# Patient Record
Sex: FEMALE | Race: WHITE | Employment: OTHER | URBAN - METROPOLITAN AREA
[De-identification: names, ages, dates, MRNs, and addresses within clinical notes are randomized per-mention and may not be internally consistent; named-entity substitution may affect disease eponyms.]

---

## 2017-02-27 RX ORDER — TACROLIMUS 1 MG/1
1 CAPSULE ORAL 2 TIMES DAILY
Qty: 180 CAP | Refills: 3 | OUTPATIENT
Start: 2017-02-27 | End: 2018-01-09 | Stop reason: SDUPTHER

## 2017-02-27 NOTE — TELEPHONE ENCOUNTER
Received VM from patient who stated she changed insurance 1/2017 and needs to have new Tacrolimus prescription sent to [facility]. Patient reports she has about 10 days left of medication, but needs to have medication refilled now because she is going out of town next week. Prescription called into [facility] ([phone]). Verified medication requires PA and initiated process. Phone call placed to patient. Updated her on the above. Encouraged patient to follow up with [facility] to confirm medication will be dispensed ASAP. Patient verbalized understanding.

## 2017-03-22 DIAGNOSIS — G52.1 GLOSSOPHARYNGEAL NEURALGIA: ICD-10-CM

## 2017-03-22 NOTE — TELEPHONE ENCOUNTER
Pt needs to refill her med (gabapentin)400 mg . 2 caps by 3 times a day. Pt wants her RX send to [facility] pharmacy. Thank you.

## 2017-03-23 RX ORDER — GABAPENTIN 400 MG/1
800 CAPSULE ORAL 3 TIMES DAILY
Qty: 540 CAP | Refills: 3 | Status: SHIPPED | OUTPATIENT
Start: 2017-03-23 | End: 2018-03-29 | Stop reason: SDUPTHER

## 2017-03-27 ENCOUNTER — OFFICE VISIT (OUTPATIENT)
Dept: HEMATOLOGY | Age: 77
End: 2017-03-27

## 2017-03-27 ENCOUNTER — DOCUMENTATION ONLY (OUTPATIENT)
Dept: HEMATOLOGY | Age: 77
End: 2017-03-27

## 2017-03-27 VITALS
DIASTOLIC BLOOD PRESSURE: 76 MMHG | TEMPERATURE: 98.3 F | HEIGHT: 63 IN | HEART RATE: 79 BPM | WEIGHT: 160.25 LBS | OXYGEN SATURATION: 98 % | SYSTOLIC BLOOD PRESSURE: 153 MMHG | BODY MASS INDEX: 28.39 KG/M2

## 2017-03-27 DIAGNOSIS — Z94.4 H/O LIVER TRANSPLANT (HCC): ICD-10-CM

## 2017-03-27 DIAGNOSIS — T86.40 COMPLICATION OF TRANSPLANTED LIVER, UNSPECIFIED COMPLICATION (HCC): Primary | ICD-10-CM

## 2017-03-27 DIAGNOSIS — Z79.899 LONG TERM CURRENT USE OF IMMUNOSUPPRESSIVE DRUG: ICD-10-CM

## 2017-03-27 RX ORDER — AZATHIOPRINE 50 MG/1
100 TABLET ORAL DAILY
Qty: 180 TAB | Refills: 3 | Status: SHIPPED | OUTPATIENT
Start: 2017-03-27 | End: 2018-04-16 | Stop reason: SDUPTHER

## 2017-03-27 NOTE — PROGRESS NOTES
Phone call placed to [facility] ([phone]) to request most recent lab results. Records were received and provided to Dr. Peng Naylor.

## 2017-03-27 NOTE — PROGRESS NOTES
93 Suzanne Viramontes MD, PARDEEP Ricci PA-C Raymond Calin, MD, MD Dipti Arellano NP     [facility], NP        [address] [facility], [address] [place] [address], [address]     [phone]     FAX: [address] [place] [address], [address] [place] [address], [address] [facility] [address]     [phone]     FAX: [phone]           Patient Care Team:  Kamari Asher MD as PCP - General (Family Practice)  Kamari Asher MD (Family Practice)  Shelton Cordon MD (Cardiology)      Problem List  Date Reviewed: 9/29/2016          Codes Class Noted    Long term current use of immunosuppressive drug ICD-10-CM: Z79.899  ICD-9-CM: V58.69  3/27/2017        Liver transplant complication [facility]) [id number]-SP: T86.40  ICD-9-CM: 996.82  5/36/9915        Diastolic congestive heart failure ([facility] [address] ICD-10-CM: I50.30  ICD-9-CM: 428.30, 428.0  9/29/2016        H/O liver transplant [facility]) ICD-10-CM: Z94.4  ICD-9-CM: V42.7  8/17/2016        S/P cataract surgery ICD-10-CM: Z98.49  ICD-9-CM: V45.61  8/17/2016        Osteoporosis ICD-10-CM: M81.0  ICD-9-CM: 733.00  8/17/2016        H/O cervical spine surgery ICD-10-CM: Z98.890  ICD-9-CM: V45.89  8/17/2016        Basal cell carcinoma ICD-10-CM: C44.91  ICD-9-CM: 173.91  8/17/2016    Overview Signed 8/17/2016 11:28 AM by Ke Raymundo MD     Multiple locations, face, back, neck, legs             S/P aortic valve replacement ICD-10-CM: Z95.2  ICD-9-CM: V43.3  8/17/2016        Pacemaker ICD-10-CM: Z95.0  ICD-9-CM: V45.01  8/17/2016        Essential and other specified forms of tremor ICD-10-CM: G25.0, G25.2  ICD-9-CM: 333.1  8/12/2013        Meningioma ([facility] [address] ICD-10-CM: D32.9  ICD-9-CM: 225.2  8/12/2013        Leg swelling ICD-10-CM: M79.89  ICD-9-CM: 729.81  Unknown        Atrial fib/flutter, transient ICD-9-CM: LVN0653  Unknown        Glossopharyngeal neuralgia ICD-10-CM: 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 The active problem list, all pertinent past medical history, medications, radiologic findings and laboratory findings related to the liver disorder were reviewed with the patient. The patient is a 77 y.o.  female who underwent a liver transplant in 2003 at [facility] for cirrhosis and hepatocellular carcinoma. She does not know why she developed cirrhosis. She stated that she had hepatitis since the age of 16 years. She was followed closely at [facility] after the LT for 3 years. She then moved to [facility], was there for 5 years and move to [place] about 5 years ago. She has not had any contact with the [facility] LT program in 10 years. The patient is currently receiving tacrolimus with Imuran for immune suppression. There has been no episodes of acute rejection. [address] has not recurred. The most recent laboratory studies indicate that the liver transaminases are normal, alkaline phosphatase is normal, tests of hepatic synthetic and metabolic function are normal, and the platelet count is normal.      The patient has no symptoms which could be attributed to the liver disorder. The patient completes all daily activities without any functional limitations. The patient has not experienced problems concentrating, swelling of the abdomen, swelling of the lower extremities, hematemesis, hematochezia. Allergies   Allergen Reactions    Codeine Rash    Demerol (Pf) [Meperidine (Pf)] Rash       Current Outpatient Prescriptions   Medication Sig    azaTHIOprine (IMURAN) 50 mg tablet Take 2 Tabs by mouth daily.  gabapentin (NEURONTIN) 400 mg capsule Take 2 Caps by mouth three (3) times daily.     tacrolimus (PROGRAF) 1 mg capsule Take 1 Cap by mouth two (2) times a day. Indications: PREVENTION OF LIVER TRANSPLANT REJECTION    gabapentin (NEURONTIN) 400 mg capsule TAKE TWO CAPSULES BY MOUTH THREE TIMES A DAY    gabapentin (NEURONTIN) 100 mg capsule One daily for a total of 900mg in the morning.  gabapentin (NEURONTIN) 400 mg capsule TAKE TWO CAPSULES BY MOUTH THREE TIMES A DAY    Omega-3-DHA-EPA-Fish Oil 1,000 mg (120 mg-180 mg) cap Take  by mouth.  bumetanide (BUMEX) 1 mg tablet Take  by mouth daily.  potassium chloride SR (KLOR-CON 10) 10 mEq tablet Take 90 mEq by mouth daily.  CALCIUM CARBONATE/MAG CARB/FA (MAGNESIUM-CALCIUM-FOLIC ACID PO) Take  by mouth.  spironolactone (ALDACTONE) 25 mg tablet Take  by mouth daily.  ALIPOIC ACID/BIOFLAV/MV/GR TEA (ULTRA SAMUEL PO) Take  by mouth.  Omeprazole delayed release (PRILOSEC D/R) 20 mg tablet Take 20 mg by mouth two (2) times a day.  WARFARIN SODIUM (WARFARIN PO) Take  by mouth. 1mg/2mg/5mg      No current facility-administered medications for this visit. SYSTEM REVIEW NOT RELATED TO LIVER DISEASE OR REVIEWED ABOVE:  Constitution systems: Negative for fever, chills, weight gain, weight loss. Eyes: Negative for visual changes. ENT: Negative for sore throat, painful swallowing. Respiratory: Negative for cough, hemoptysis, SOB. Cardiology: Heart valve surgery/repalcement. No SOB. No TAO. GI:  Negative for constipation or diarrhea. : Negative for urinary frequency, dysuria, hematuria, nocturia. Skin: Multiple skin cancers on the arms, legs, face. Hematology: Negative for easy bruising, blood clots. Musculo-skelatal: Negative for back pain, muscle pain, weakness. Neurologic: Negative for headaches, dizziness, vertigo, memory problems not related to HE. Psychology: Negative for anxiety, depression.        FAMILY HISTORY:  There is no family history of liver disease  There is no family history of heart disease    SOCIAL HISTORY:  The patient is . The patient has 3 children, 3 stepchildren, and 10 grandchildren. The patient has never used tobacco products. The patient has never consumed significant amounts of alcohol. The patient used to work as in many different jobs. PHYSICAL EXAMINATION:  Visit Vitals    /76    Pulse 79    Temp 98.3 °F (36.8 °C) (Tympanic)    Ht 5' 3\" (1.6 m)    Wt 160 lb 4 oz (72.7 kg)    SpO2 98%    BMI 28.39 kg/m2     General: No acute distress. Eyes: Sclera anicteric. ENT: No oral lesions. Thyroid normal.  Nodes: No adenopathy. Skin: Multiple scars from skin cancer surgeries. Respiratory: Lungs clear to auscultation. Cardiovascular: Regular heart rate. No murmurs. No JVD. Abdomen: Soft non-tender. Liver size normal to percussion/palpation. Spleen not palpable. No obvious ascites. Extremities: No edema. No muscle wasting. No gross arthritic changes. Neurologic: Alert and oriented. Cranial nerves grossly intact. No asterixis.       LABORATORY STUDIES:  From 4/2016  AST/ALT/ALP/T Bili/ALB:  23/18/80/0.4/4.9  WBC/HB/PLT/INR:  6.5/10.2/187  BUN/CREAT:  23/1.23    Liver [facility] Ref Rng 8/17/2016   WBC 3.4 - 10.8 x10E3/uL 6.8   ANC 1.4 - 7.0 x10E3/uL 4.6   HGB 11.1 - 15.9 g/dL 11.1    - 379 x10E3/uL 174   AST 0 - 40 IU/L 26   ALT 0 - 32 IU/L 19   Alk Phos 39 - 117 IU/L 72   Bili, Total 0.0 - 1.2 mg/dL 0.7   Bili, Direct 0.00 - 0.40 mg/dL 0.17   Albumin 3.5 - 4.8 g/dL 5.1 (H)   BUN 8 - 27 mg/dL 22   Creat 0.57 - 1.00 mg/dL 1.01 (H)   Creat (iSTAT) 0.6 - 1.3 MG/DL    Na 134 - 144 mmol/L 141   K 3.5 - 5.2 mmol/L 4.4   Cl 97 - 108 mmol/L 95 (L)   CO2 18 - 29 mmol/L 27   Glucose 65 - 99 mg/dL 91     From 2/2017  AST/ALT/ALP/T Bili/ALB:  27/22/46/0.8/4.3  WBC/HB/PLT/INR:  5.3/10.8/166  BUN/CREAT:  19/1.1    Liver Virology and Transplant Immune Suppression Latest Ref Rng 8/17/2016   Tacrolimus Level 2.0 - 20.0 ng/mL 6.3     SEROLOGIES:  Not available or performed. Testing was performed today. LIVER HISTOLOGY:  Not available or performed    ENDOSCOPIC PROCEDURES:  Not available or performed    RADIOLOGY:  Not available or performed    OTHER TESTING:  Not available or performed    ASSESSMENT AND PLAN:  Liver transplant with normal graft function. The liver enzymes are all normal.  The liver function is normal.  The platelet count is normal.    The patient is receiving immune suppression with tacrolimus which is being well tolerated with no side effects. The TAC blood level is adequate. We could probably reduce her immune suppression down to a TAC level of 4 by reducing the dose of TAC to 0.5 mg BID. However, we did not get a repeat TAC lvel with the recent blood work. Will have her get a TAC level so we can do this. Imuran is being tolerated well tolerated with no side effects. Will continue at current dose     Hepatocellular carcinoma was present in native liver prior to undergoing liver transplant. There has been no recurrence to date based upon imaging. Recent ultasound demonstrated a normal liver with no evidence of recurrent HCC. She does not have CKD which can be caused by immune suppression. She does not have hypercholesterolemia whjcih can be caused by immune supression. She does not have Hypertension which can be caused by immune suppression. This is adequately treated. The patient was counseled regarding alcohol use. Multiple skin cancers are secondary to long term immune suppression. She should continue to be monitored by Dermatology. Osteoporosis can be caused by immune suppression. This needs to be assessed with bone density. Low serum magnesium can be caused by immune suppression. Will check serum mag. Patient should receive annual flu-vaccine from their primary care provider. Live vaccines should not be administered.     Laboratory studies including a tacrolimus level can be performed every 6 months. If she agrees to try and lower TAC dose we would need to check labs more frequently for a while. All of the above issues were discussed with the patient. All questions were answered. The patient expressed a clear understanding of the above. [address] [facility] 6 months.     Joanne Ly MD  Liver [facility] [address]  [address]  [place]  [address].  [phone]

## 2017-05-02 ENCOUNTER — TELEPHONE (OUTPATIENT)
Dept: NEUROLOGY | Age: 77
End: 2017-05-02

## 2017-05-02 NOTE — TELEPHONE ENCOUNTER
Pt stated she started with shingles in march and the sores are all healed up now. She was debating on whether to keep the appt for tomorrow anyway, the areas she is having trouble with she never had any issues with until after the shingles went away. In her shoulder and arm. She has lost some range of motion there and in her thumb. She is gradually getting some of it back but wasn't sure if was something that needed to be evaluated or not? Please advise     This may not relate to the shingles at all since this is an area that was not bothered by it when that was active. It sound more like a shoulder joint issue. Have her follow up with her PCP initially per NP.     contacted patient back. will send another message to NP for direction.

## 2017-05-02 NOTE — TELEPHONE ENCOUNTER
Pt stated she did check with her PCP and that was whom recommended her to come see us to see if it nerve damage? She stated if she you still didn't feel like it was necessary or neurology related she does have another appt with her PCP tomorrow. Please advise     I don't think it is neurology related. You would not have a decrease in ROM solely due to a neurological issue. Those types of problems are usually orthopedic. Her shingles was in March and she did not have any issues with the arm until after that resolved. Per NP.     contacted her to relay to her the previous message sent from NP. she has verbalized understanding and will cancel her appt she has for right now.

## 2018-01-10 RX ORDER — TACROLIMUS 1 MG/1
CAPSULE ORAL
Qty: 180 CAP | Refills: 3 | Status: SHIPPED | OUTPATIENT
Start: 2018-01-10 | End: 2019-01-28 | Stop reason: SDUPTHER

## 2018-04-09 ENCOUNTER — OFFICE VISIT (OUTPATIENT)
Dept: NEUROLOGY | Age: 78
End: 2018-04-09

## 2018-04-09 VITALS
WEIGHT: 145 LBS | DIASTOLIC BLOOD PRESSURE: 76 MMHG | HEART RATE: 80 BPM | HEIGHT: 63 IN | SYSTOLIC BLOOD PRESSURE: 132 MMHG | OXYGEN SATURATION: 98 % | BODY MASS INDEX: 25.69 KG/M2 | RESPIRATION RATE: 20 BRPM

## 2018-04-09 DIAGNOSIS — G25.0 ESSENTIAL TREMOR: ICD-10-CM

## 2018-04-09 DIAGNOSIS — G52.1 GLOSSOPHARYNGEAL NEURALGIA: Primary | ICD-10-CM

## 2018-04-09 RX ORDER — GABAPENTIN 100 MG/1
CAPSULE ORAL
Qty: 180 CAP | Refills: 3 | Status: SHIPPED | OUTPATIENT
Start: 2018-04-09 | End: 2019-04-09 | Stop reason: SDUPTHER

## 2018-04-09 RX ORDER — GABAPENTIN 400 MG/1
CAPSULE ORAL
Qty: 540 CAP | Refills: 3 | Status: SHIPPED | OUTPATIENT
Start: 2018-04-09 | End: 2019-04-09 | Stop reason: SDUPTHER

## 2018-04-09 NOTE — MR AVS SNAPSHOT
[address] [place] [address] [address] [facility] [facility] [id number] [phone] Patient: Marleen Maciel MRN: [id number] DZL:[id number] Visit Information Date & Time Provider Department Dept. Phone Encounter #  
 4/9/2018 10:00 AM William Hernandez, PARDEEP [facility] Neurology [facility] [phone] [id number] Follow-up Instructions Return in about 1 year (around 4/9/2019). Upcoming Health Maintenance Date Due DTaP/Tdap/Td series (1 - Tdap) 8/19/1962 ZOSTER VACCINE AGE 60> 6/19/2001 GLAUCOMA SCREENING Q2Y 8/19/2006 Bone Densitometry (Dexa) Screening 8/19/2006 Pneumococcal 65+ Low/Medium Risk (1 of 2 - PCV13) 8/19/2006 Influenza Age 5 to Adult 8/1/2017 MEDICARE YEARLY EXAM 3/28/2018 Allergies as of 4/9/2018  Review Complete On: 4/9/2018 By: William , PARDEEP Severity Noted Reaction Type Reactions Codeine  10/24/2012    Rash Demerol (Pf) [Meperidine (Pf)]  10/24/2012    Rash Current Immunizations  Never Reviewed No immunizations on file. Not reviewed this visit You Were Diagnosed With   
  
 Codes Comments Glossopharyngeal neuralgia    -  Primary ICD-10-CM: G52.1 ICD-9-CM: 352.1 Essential tremor     ICD-10-CM: G25.0 ICD-9-CM: 333.1 Vitals BP Pulse Resp Height(growth percentile) Weight(growth percentile) SpO2  
 132/76 80 20 5' 3\" (1.6 m) 145 lb (65.8 kg) 98% BMI OB Status Smoking Status 25.69 kg/m2 Postmenopausal Never Smoker Vitals History BMI and BSA Data Body Mass Index Body Surface Area  
 25.69 kg/m 2 1.71 m 2 Preferred Pharmacy Pharmacy Name Phone [address] [phone] Your Updated Medication List  
  
   
This list is accurate as of 4/9/18 10:57 AM.  Always use your most recent med list.  
  
  
  
  
 azaTHIOprine 50 mg tablet Commonly known as:  [employer] Take 2 Tabs by mouth daily. bumetanide 1 mg tablet Commonly known as:  Ricardo Mccain Take  by mouth daily. * gabapentin 100 mg capsule Commonly known as:  NEURONTIN One daily for a total of 900mg in the morning. * gabapentin 400 mg capsule Commonly known as:  NEURONTIN  
TAKE TWO CAPSULES BY MOUTH THREE TIMES A DAY MAGNESIUM-CALCIUM-FOLIC ACID PO Take  by mouth. Omeprazole delayed release 20 mg tablet Commonly known as:  PRILOSEC D/R Take 20 mg by mouth two (2) times a day. potassium chloride SR 10 mEq tablet Commonly known as:  KLOR-CON 10 Take 90 mEq by mouth daily. spironolactone 25 mg tablet Commonly known as:  ALDACTONE Take  by mouth daily. tacrolimus 1 mg capsule Commonly known as:  PROGRAF  
TAKE 1 CAPSULE TWO TIMES A DAY ULTRA SAMUEL PO Take  by mouth. WARFARIN PO Take  by mouth. 1mg/2mg/5mg * Notice: This list has 2 medication(s) that are the same as other medications prescribed for you. Read the directions carefully, and ask your doctor or other care provider to review them with you. Prescriptions Sent to Pharmacy Refills  
 gabapentin (NEURONTIN) 100 mg capsule 3 Sig: One daily for a total of 900mg in the morning. Class: Normal  
 Pharmacy: [address], [address] Ph #: [phone]  
 gabapentin (NEURONTIN) 400 mg capsule 3 Sig: TAKE TWO CAPSULES BY MOUTH THREE TIMES A DAY Class: Normal  
 Pharmacy: [address], [address] Ph #: [phone] Follow-up Instructions Return in about 1 year (around 4/9/2019). Patient Instructions A Healthy Lifestyle: Care Instructions Your Care Instructions A healthy lifestyle can help you feel good, stay at a healthy weight, and have plenty of energy for both work and play.  A healthy lifestyle is something you can share with your whole family. A healthy lifestyle also can lower your risk for serious health problems, such as high blood pressure, heart disease, and diabetes. You can follow a few steps listed below to improve your health and the health of your family. Follow-up care is a key part of your treatment and safety. Be sure to make and go to all appointments, and call your doctor if you are having problems. It's also a good idea to know your test results and keep a list of the medicines you take. How can you care for yourself at home? · Do not eat too much sugar, fat, or fast foods. You can still have dessert and treats now and then. The goal is moderation. · Start small to improve your eating habits. Pay attention to portion sizes, drink less juice and soda pop, and eat more fruits and vegetables. ¨ Eat a healthy amount of food. A 3-ounce serving of meat, for example, is about the size of a deck of cards. Fill the rest of your plate with vegetables and whole grains. ¨ Limit the amount of soda and sports drinks you have every day. Drink more water when you are thirsty. ¨ Eat at least 5 servings of fruits and vegetables every day. It may seem like a lot, but it is not hard to reach this goal. A serving or helping is 1 piece of fruit, 1 cup of vegetables, or 2 cups of leafy, raw vegetables. Have an apple or some carrot sticks as an afternoon snack instead of a candy bar. Try to have fruits and/or vegetables at every meal. 
· Make exercise part of your daily routine. You may want to start with simple activities, such as walking, bicycling, or slow swimming. Try to be active 30 to 60 minutes every day. You do not need to do all 30 to 60 minutes all at once. For example, you can exercise 3 times a day for 10 or 20 minutes.  Moderate exercise is safe for most people, but it is always a good idea to talk to your doctor before starting an exercise program. 
 · Keep moving. Dinesh Noel the lawn, work in the garden, or [employer]. Take the stairs instead of the elevator at work. · If you smoke, quit. People who smoke have an increased risk for heart attack, stroke, cancer, and other lung illnesses. Quitting is hard, but there are ways to boost your chance of quitting tobacco for good. ¨ Use nicotine gum, patches, or lozenges. ¨ Ask your doctor about stop-smoking programs and medicines. ¨ Keep trying. In addition to reducing your risk of diseases in the future, you will notice some benefits soon after you stop using tobacco. If you have shortness of breath or asthma symptoms, they will likely get better within a few weeks after you quit. · Limit how much alcohol you drink. Moderate amounts of alcohol (up to 2 drinks a day for men, 1 drink a day for women) are okay. But drinking too much can lead to liver problems, high blood pressure, and other health problems. Family health If you have a family, there are many things you can do together to improve your health. · Eat meals together as a family as often as possible. · Eat healthy foods. This includes fruits, vegetables, lean meats and dairy, and whole grains. · Include your family in your fitness plan. Most people think of activities such as jogging or tennis as the way to fitness, but there are many ways you and your family can be more active. Anything that makes you breathe hard and gets your heart pumping is exercise. Here are some tips: 
¨ Walk to do errands or to take your child to school or the bus. ¨ Go for a family bike ride after dinner instead of watching TV. Where can you learn more? Go to http://lindsey-darius.info/. Enter JOHN in the search box to learn more about \"A Healthy Lifestyle: Care Instructions. \" Current as of: May 12, 2017 Content Version: 11.4 © 5947-6188 Healthwise, Incorporated.  Care instructions adapted under license by [address] (which disclaims liability or warranty for this information). If you have questions about a medical condition or this instruction, always ask your healthcare professional. [address] any warranty or liability for your use of this information. Introducing [facility]! Dear Alden Wood: 
Thank you for requesting a [employer] account. Our records indicate that you already have an active [employer] account. You can access your account anytime at [email or web address] Did you know that you can access your hospital and ER discharge instructions at any time in [employer]? You can also review all of your test results from your hospital stay or ER visit. Additional Information If you have questions, please visit the Frequently Asked Questions section of the [employer] website at [email or web address] Remember, [employer] is NOT to be used for urgent needs. For medical emergencies, dial 911. Now available from your iPhone and Android! Please provide this summary of care documentation to your next provider. Your primary care clinician is listed as Tra [place]. If you have any questions after today's visit, please call [phone].

## 2018-04-09 NOTE — PROGRESS NOTES
Date:  18     Name:  Juaquin Marie  :  1940  MRN:  [id number]     PCP:  Dontae Pa MD    Chief Complaint   Patient presents with    Follow-up     neuralgia        HISTORY OF PRESENT ILLNESS: Follow up for glossopharyngeal neuralgia and essential tremor. She does not notice the neuralgia as long as she takes the gabapentin. The tremor has been a bit more noticeable particularly when she is typing but it really does not interfere with anything that she wants to do. Except as noted above, denies  fever, chills, cough. No CP or SOB. No dysuria, loss of bowel or bladder control. No Weight loss. Appetite good. Sleeping well. No sweats. No edema. No bruising or bleeding. No nausea or vomit. No diarrhea. No frequency, urgency, No depressive sxs. No anxiety. Denies sore throat, nasal congestion, nasal discharge, epistaxis, tinnitus, hearing loss, back pain, muscle pain, or joint pain. Current Outpatient Prescriptions   Medication Sig    tacrolimus (PROGRAF) 1 mg capsule TAKE 1 CAPSULE TWO TIMES A DAY    gabapentin (NEURONTIN) 100 mg capsule One daily for a total of 900mg in the morning.  azaTHIOprine (IMURAN) 50 mg tablet Take 2 Tabs by mouth daily.  gabapentin (NEURONTIN) 400 mg capsule TAKE TWO CAPSULES BY MOUTH THREE TIMES A DAY    bumetanide (BUMEX) 1 mg tablet Take  by mouth daily.  potassium chloride SR (KLOR-CON 10) 10 mEq tablet Take 90 mEq by mouth daily.  CALCIUM CARBONATE/MAG CARB/FA (MAGNESIUM-CALCIUM-FOLIC ACID PO) Take  by mouth.  spironolactone (ALDACTONE) 25 mg tablet Take  by mouth daily.  ALIPOIC ACID/BIOFLAV/MV/GR TEA (ULTRA SAMUEL PO) Take  by mouth.  Omeprazole delayed release (PRILOSEC D/R) 20 mg tablet Take 20 mg by mouth two (2) times a day.  WARFARIN SODIUM (WARFARIN PO) Take  by mouth. 1mg/2mg/5mg      No current facility-administered medications for this visit.       Allergies   Allergen Reactions    Codeine Rash  Demerol (Pf) [Meperidine (Pf)] Rash     Past Medical History:   Diagnosis Date    Atrial fib/flutter, transient     Benign meningioma of brain (HCC)     Leg swelling     Liver disease     Memory loss     Skipped beats     SOB (shortness of breath)      Past Surgical History:   Procedure Laterality Date    ABDOMEN SURGERY PROC UNLISTED      CARDIAC SURG PROCEDURE UNLIST  04/30/2013    valve replacement    HX ORTHOPAEDIC       Social History     Social History    Marital status:      Spouse name: N/A    Number of children: N/A    Years of education: N/A     Occupational History    Not on file. Social History Main Topics    Smoking status: Never Smoker    Smokeless tobacco: Never Used    Alcohol use Yes      Comment: social, wine    Drug use: No    Sexual activity: Not on file     Other Topics Concern    Not on file     Social History Narrative     Family History   Problem Relation Age of Onset    Heart Disease Father        PHYSICAL EXAMINATION:    Visit Vitals    /76    Pulse 80    Resp 20    Ht 5' 3\" (1.6 m)    Wt 65.8 kg (145 lb)    SpO2 98%    BMI 25.69 kg/m2     General: Well defined, nourished, and groomed individual in no acute distress.    Neck: Supple, nontender, no bruits, no pain with resistance to active range of motion.    Heart: Regular rate and rhythm, no murmurs, rub, or gallop. Normal S1S2. Lungs: Clear to auscultation bilaterally with equal chest expansion, no cough, no wheeze  Musculoskeletal: Extremities revealed no edema and had full range of motion of joints.    Psych: Good mood and bright affect     NEUROLOGICAL EXAMINATION:    Mental Status: Alert and oriented to person, place, and time     Cranial Nerves:    II, III, IV, VI: Visual acuity grossly intact. Visual fields are normal.    Pupils are equal, round, and reactive to light and accommodation.    Extra-ocular movements are full and fluid. Fundoscopic exam was benign, no ptosis or nystagmus.    V-XII: Hearing is grossly intact. Facial features are symmetric, with normal sensation and strength. The palate rises symmetrically and the tongue protrudes midline. Sternocleidomastoids 5/5.      Motor Examination: Normal tone, bulk, and strength, 5/5 muscle strength throughout.      Coordination: Finger to nose was normal. No resting or intention tremor     Gait and Station: Steady while walking. Normal arm swing. No pronator drift. No muscle wasting or fasiculations noted.      Reflexes: DTRs 2+ throughout. ASSESSMENT AND PLAN    ICD-10-CM ICD-9-CM    1. Glossopharyngeal neuralgia G52.1 352.1 gabapentin (NEURONTIN) 100 mg capsule      gabapentin (NEURONTIN) 400 mg capsule   2. Essential tremor G25.0 333.1      Review of PMH does not reveal any new diagnosis, medications, or surgeries. Review of systems was negative. Neuralgia and essential tremor are stable on present therapy without side effect. Follow up yearly or sooner if needed. Carissa Camacho

## 2018-04-09 NOTE — PATIENT INSTRUCTIONS

## 2018-04-09 NOTE — PROGRESS NOTES
Has been doing well with the neuralgia- the dosage that she is on seems to be doing good with maintaining things

## 2018-04-16 DIAGNOSIS — Z94.4 H/O LIVER TRANSPLANT (HCC): Primary | ICD-10-CM

## 2018-04-16 RX ORDER — AZATHIOPRINE 50 MG/1
100 TABLET ORAL DAILY
Qty: 180 TAB | Refills: 3 | Status: SHIPPED | OUTPATIENT
Start: 2018-04-16 | End: 2019-04-18 | Stop reason: SDUPTHER

## 2018-04-18 NOTE — TELEPHONE ENCOUNTER
Received VM from patient who reports she has ~2 weeks left of Imuran. Patient asked for new prescription to be sent to [facility]. Returned phone call to patient. Notified medication refill has been sent to requested pharmacy. Patient notified she needs to schedule f/u appointment with Dr. Keegan Manning and also needs to have labs drawn. Orders placed for lab work and requisition mailed to patient. Patient prefers to have labs drawn at [facility]. Asked that patient notify NN when labs have been drawn to ensure office receives results. Patient verbalized understanding.

## 2018-05-03 LAB
ALBUMIN SERPL-MCNC: 4.8 G/DL (ref 3.5–4.8)
ALP SERPL-CCNC: 62 IU/L (ref 39–117)
ALT SERPL-CCNC: 19 IU/L (ref 0–32)
AST SERPL-CCNC: 24 IU/L (ref 0–40)
BILIRUB DIRECT SERPL-MCNC: 0.18 MG/DL (ref 0–0.4)
BILIRUB SERPL-MCNC: 0.7 MG/DL (ref 0–1.2)
BUN SERPL-MCNC: 19 MG/DL (ref 8–27)
BUN/CREAT SERPL: 18 (ref 12–28)
CALCIUM SERPL-MCNC: 9.6 MG/DL (ref 8.7–10.3)
CHLORIDE SERPL-SCNC: 97 MMOL/L (ref 96–106)
CHOLEST SERPL-MCNC: 205 MG/DL (ref 100–199)
CO2 SERPL-SCNC: 28 MMOL/L (ref 18–29)
CREAT SERPL-MCNC: 1.06 MG/DL (ref 0.57–1)
ERYTHROCYTE [DISTWIDTH] IN BLOOD BY AUTOMATED COUNT: 15.9 % (ref 12.3–15.4)
GFR SERPLBLD CREATININE-BSD FMLA CKD-EPI: 51 ML/MIN/1.73
GFR SERPLBLD CREATININE-BSD FMLA CKD-EPI: 59 ML/MIN/1.73
GLUCOSE SERPL-MCNC: 91 MG/DL (ref 65–99)
HCT VFR BLD AUTO: 32.6 % (ref 34–46.6)
HDLC SERPL-MCNC: 55 MG/DL
HGB BLD-MCNC: 10.7 G/DL (ref 11.1–15.9)
LDLC SERPL CALC-MCNC: 131 MG/DL (ref 0–99)
MAGNESIUM SERPL-MCNC: 2.1 MG/DL (ref 1.6–2.3)
MCH RBC QN AUTO: 29.8 PG (ref 26.6–33)
MCHC RBC AUTO-ENTMCNC: 32.8 G/DL (ref 31.5–35.7)
MCV RBC AUTO: 91 FL (ref 79–97)
PLATELET # BLD AUTO: 165 X10E3/UL (ref 150–379)
POTASSIUM SERPL-SCNC: 4.5 MMOL/L (ref 3.5–5.2)
PROT SERPL-MCNC: 6.6 G/DL (ref 6–8.5)
RBC # BLD AUTO: 3.59 X10E6/UL (ref 3.77–5.28)
SODIUM SERPL-SCNC: 141 MMOL/L (ref 134–144)
TACROLIMUS, 700249: 3.2 NG/ML (ref 2–20)
TRIGL SERPL-MCNC: 96 MG/DL (ref 0–149)
VLDLC SERPL CALC-MCNC: 19 MG/DL (ref 5–40)
WBC # BLD AUTO: 4.1 X10E3/UL (ref 3.4–10.8)

## 2018-05-14 ENCOUNTER — PATIENT OUTREACH (OUTPATIENT)
Dept: HEMATOLOGY | Age: 78
End: 2018-05-14

## 2018-05-14 NOTE — PROGRESS NOTES
Phone call placed to patient. Reviewed most recent lab results. No changes made. Confirmed f/u appointment on 6/14. Patient verbalized understanding.

## 2018-06-14 ENCOUNTER — OFFICE VISIT (OUTPATIENT)
Dept: HEMATOLOGY | Age: 78
End: 2018-06-14

## 2018-06-14 VITALS
OXYGEN SATURATION: 96 % | SYSTOLIC BLOOD PRESSURE: 117 MMHG | HEART RATE: 73 BPM | TEMPERATURE: 97 F | DIASTOLIC BLOOD PRESSURE: 59 MMHG | BODY MASS INDEX: 27.82 KG/M2 | WEIGHT: 157 LBS | HEIGHT: 63 IN

## 2018-06-14 DIAGNOSIS — Z94.4 H/O LIVER TRANSPLANT (HCC): Primary | ICD-10-CM

## 2018-06-14 DIAGNOSIS — Z94.4 H/O LIVER TRANSPLANT (HCC): ICD-10-CM

## 2018-06-14 RX ORDER — BISMUTH SUBSALICYLATE 262 MG
1 TABLET,CHEWABLE ORAL DAILY
COMMUNITY

## 2018-06-14 NOTE — PROGRESS NOTES
93 Suzanne Viramontes MD, Catalina Amato, NP Spero Brenner, PA-C Aura Romberg, MD, MD Dipti Fair NP Lovella Muff, NP        [address] [facility], [address] Ariana Lynne  22.     [phone]     FAX: [address] [place] [address], [address]     [phone]     FAX: [phone]           Patient Care Team:  Xochilt Bansal MD as PCP - General (Family Practice)  Xochilt Bansal MD (Family Practice)  Lesley Núñez MD (Cardiology)      Problem List  Date Reviewed: 4/9/2018          Codes Class Noted    Long term current use of immunosuppressive drug ICD-10-CM: Z79.899  ICD-9-CM: V58.69  3/27/2017        Liver transplant complication ([facility] QGC-78-RH: T86.40  ICD-9-CM: 996.82  3/46/9374        Diastolic congestive heart failure ([facility] ICD-10-CM: I50.30  ICD-9-CM: 428.30, 428.0  9/29/2016        H/O liver transplant [facility]) ICD-10-CM: Z94.4  ICD-9-CM: V42.7  8/17/2016        S/P cataract surgery ICD-10-CM: Z98.49  ICD-9-CM: V45.61  8/17/2016        Osteoporosis ICD-10-CM: M81.0  ICD-9-CM: 733.00  8/17/2016        H/O cervical spine surgery ICD-10-CM: Z98.890  ICD-9-CM: V45.89  8/17/2016        Basal cell carcinoma ICD-10-CM: C44.91  ICD-9-CM: 173.91  8/17/2016    Overview Signed 8/17/2016 11:28 AM by Aura Romberg, MD     Multiple locations, face, back, neck, legs             S/P aortic valve replacement ICD-10-CM: Z95.2  ICD-9-CM: V43.3  8/17/2016        Pacemaker ICD-10-CM: Z95.0  ICD-9-CM: V45.01  8/17/2016        Essential and other specified forms of tremor ICD-10-CM: G25.0, G25.2  ICD-9-CM: 333.1  8/12/2013        Meningioma ([facility] ICD-10-CM: D32.9  ICD-9-CM: 225.2  8/12/2013        Leg swelling ICD-10-CM: M79.89  ICD-9-CM: 729.81  Unknown        Atrial fib/flutter, transient ICD-9-CM: [id number]  Unknown        Glossopharyngeal neuralgia ICD-10-CM: G52.1  ICD-9-CM: 352.1  10/24/2012                Jeremy Gallegos returns to the [address] for management of liver graft function and immune suppression following a liver transplant. The active problem list, all pertinent past medical history, medications, radiologic findings and laboratory findings related to the liver disorder were reviewed with the patient. The patient is a 69 y.o.  female who underwent a liver transplant in 2003 at [facility] for cirrhosis and hepatocellular carcinoma. She does not know why she developed cirrhosis. She stated that she had hepatitis since the age of 16 years. She was followed closely at [facility] after the LT for 3 years. She then moved to [facility], was there for 5 years and move to [place] in 2011. She has not had any contact with the [facility] LT program since 2001. The patient has no symptoms which could be attributed to the liver disorder. The patient completes all daily activities without any functional limitations. The patient has not experienced problems concentrating, swelling of the abdomen, swelling of the lower extremities, hematemesis, hematochezia. All of the issues listed in the Assessment and Plan were discussed with the patient. All questions were answered. The patient expressed a clear understanding of the above. Follow-up David Garrido 32 in 12 months       ASSESSMENT AND PLAN:  Liver transplant   This was performed at [facility] in 2003. The liver transaminases are normal.  The ALP is normal.  The liver function is normal.  The platelet count is normal.  There has not been any episodes of graft rejection. Laboratory studies including a tacrolimus level can be performed every 6 months. Immune Suppression  Tacrolimus is being well tolerated with no side effects.     The TAC blood level is 3.2 which is adequate given that she is now 13 years out from the LT. Will continue the current dose of TC at 1 mg BID. Imuran is being tolerated well tolerated with no side effects. Will continue at current dose 100 mg QD    Hepatocellular carcinoma  This was present in native liver prior to undergoing liver transplant. There has been no recurrence to date based upon imaging. No further screening for recurrent HCC is needed since she is 15 years out from the LT. Chronic kidney disease  This is common in liver transplant recipients and is a common side effect of immune suppression with calcineurin agents. There is no evidence of CKD at this time. NSAIDs should not be utilized as this may worsen CKD. Hypercholesterolemia   This is a common side effect of immune supression. She does not have hypercholesterolemia    Hypertension  This is a common side effect of immune suppression with calcineurin agents. She does not have hypertension at this time. Treatment of other medical problems in patients with chronic liver disease  There are no contraindications for the patient to take any medications that are necessary for treatment of other medical issues. The patient does not consume alcohol daily. Normal doses of acetaminophen can be used for pain as needed. Normal doses of acetaminophen as recommended on the label are not hepatotoxic, even in patients with cirrhosis. Counseling for alcohol in patients with chronic liver disease  The patient was counseled regarding alcohol consumption and the effect of alcohol on chronic liver disease. The patient has normal liver function. She can consume an occasional alcoholic beverage,      Multiple skin cancers   The patient has developed multiple skin cancers. Skin cancers are common after liver transplant due to chronic immujne suppression. She should continue to be monitored by Dermatology. Osteoporosis  The risk of osteoporosis is increased in following liver transplantation.     DEXA bone density to assess for osteoporosis has not been performed. This should be ordered by the patients primary care physician. Hypomagnesia  Low serum magnesium can be caused by immune suppression. Will check serum mag and provide oral supplement if low. Vaccinations   Routine vaccinations against other bacterial and viral agents can be performed as long as live vaccines are not utilizied. Annual flu vaccination should be administered if indicated. Allergies   Allergen Reactions    Codeine Rash    Demerol (Pf) [Meperidine (Pf)] Rash       Current Outpatient Prescriptions   Medication Sig    multivitamin (ONE A DAY) tablet Take 1 Tab by mouth daily.  azaTHIOprine (IMURAN) 50 mg tablet Take 2 Tabs by mouth daily.  gabapentin (NEURONTIN) 100 mg capsule One daily for a total of 900mg in the morning.  gabapentin (NEURONTIN) 400 mg capsule TAKE TWO CAPSULES BY MOUTH THREE TIMES A DAY    tacrolimus (PROGRAF) 1 mg capsule TAKE 1 CAPSULE TWO TIMES A DAY    bumetanide (BUMEX) 1 mg tablet Take  by mouth daily.  potassium chloride SR (KLOR-CON 10) 10 mEq tablet Take 90 mEq by mouth daily.  CALCIUM CARBONATE/MAG CARB/FA (MAGNESIUM-CALCIUM-FOLIC ACID PO) Take  by mouth.  spironolactone (ALDACTONE) 25 mg tablet Take  by mouth daily.  ALIPOIC ACID/BIOFLAV/MV/GR TEA (ULTRA SAMUEL PO) Take  by mouth.  Omeprazole delayed release (PRILOSEC D/R) 20 mg tablet Take 20 mg by mouth two (2) times a day.  WARFARIN SODIUM (WARFARIN PO) Take  by mouth. 1mg/2mg/5mg      No current facility-administered medications for this visit. SYSTEM REVIEW NOT RELATED TO LIVER DISEASE OR REVIEWED ABOVE:  Constitution systems: Negative for fever, chills, weight gain, weight loss. Eyes: Negative for visual changes. ENT: Negative for sore throat, painful swallowing. Respiratory: Negative for cough, hemoptysis, SOB. Cardiology: Heart valve surgery/repalcement. No SOB. No TAO.     GI:  Negative for constipation or diarrhea. : Negative for urinary frequency, dysuria, hematuria, nocturia. Skin: Multiple skin cancers on the arms, legs, face. Hematology: Negative for easy bruising, blood clots. Musculo-skelatal: Negative for back pain, muscle pain, weakness. Neurologic: Negative for headaches, dizziness, vertigo, memory problems not related to HE. Psychology: Negative for anxiety, depression. FAMILY HISTORY:  There is no family history of liver disease  There is no family history of heart disease    SOCIAL HISTORY:  The patient is . The patient has 3 children, 3 stepchildren, and 10 grandchildren. The patient has never used tobacco products. The patient has never consumed significant amounts of alcohol. The patient used to work as in many different jobs. PHYSICAL EXAMINATION:  Visit Vitals    /59 (BP 1 Location: Left arm, BP Patient Position: Sitting)    Pulse 73    Temp 97 °F (36.1 °C) (Tympanic)    Ht 5' 3\" (1.6 m)    Wt 157 lb (71.2 kg)    SpO2 96%    BMI 27.81 kg/m2     General: No acute distress. Eyes: Sclera anicteric. ENT: No oral lesions. Thyroid normal.  Nodes: No adenopathy. Skin: Multiple scars from skin cancer surgeries. Respiratory: Lungs clear to auscultation. Cardiovascular: Regular heart rate. No murmurs. No JVD. Abdomen: Soft non-tender. Liver size normal to percussion/palpation. Spleen not palpable. No obvious ascites. Extremities: No edema. No muscle wasting. No gross arthritic changes. Neurologic: Alert and oriented. Cranial nerves grossly intact. No asterixis.       LABORATORY STUDIES:  [facility] of [address] [place] [address] 5/2/2018 8/17/2016   WBC 3.4 - 10.8 x10E3/uL 4.1 6.8   ANC 1.4 - 7.0 x10E3/uL  4.6   HGB 11.1 - 15.9 g/dL 10.7 (L) 11.1    - 379 x10E3/uL 165 174   AST 0 - 40 IU/L 24 26   ALT 0 - 32 IU/L 19 19   Alk Phos 39 - 117 IU/L 62 72   Bili, Total 0.0 - 1.2 mg/dL 0.7 0.7   Bili, Direct 0.00 - 0.40 mg/dL 0.18 0.17   Albumin 3.5 - 4.8 g/dL 4.8 5.1 (H)   BUN 8 - 27 mg/dL 19 22   Creat 0.57 - 1.00 mg/dL 1.06 (H) 1.01 (H)   Creat (iSTAT) 0.6 - 1.3 MG/DL     Na 134 - 144 mmol/L 141 141   K 3.5 - 5.2 mmol/L 4.5 4.4   Cl 96 - 106 mmol/L 97 95 (L)   CO2 18 - 29 mmol/L 28 27   Glucose 65 - 99 mg/dL 91 91   Magnesium 1.6 - 2.3 mg/dL 2.1        Liver Virology and Transplant Immune Suppression Latest Ref Rng & Units 5/2/2018   Tacrolimus Level 2.0 - 20.0 ng/mL 3.2     Liver Virology and Transplant Immune Suppression Latest Ref Rng & Units 8/17/2016   Tacrolimus Level 2.0 - 20.0 ng/mL 6.3     SEROLOGIES:  Not available or performed. Testing was performed today.     LIVER HISTOLOGY:  Not available or performed    ENDOSCOPIC PROCEDURES:  Not available or performed    RADIOLOGY:  Not available or performed    OTHER TESTING:  Not available or performed      MD Chay Kelly 13 of [facility] Lisa Toussaint [address] Gisela ROTH[address]  22.  [phone]

## 2018-06-14 NOTE — MR AVS SNAPSHOT
[address] [facility] [phone] [place] [address] 
[phone] Patient: Rossana Hart MRN: [id number] GUN:[birth year] Visit Information Date & Time Provider Department Dept. Phone Encounter #  
 6/14/2018 10:30 AM Jw Hutton. [place] [address] of [place] [address] [id number] Your Appointments 4/8/2019 10:00 AM  
Follow Up with Jason Link NP [address] [facility] ([address]) Appt Note: 1 year f/up glossopharyngeal neuralgia cr  
 [address] Samantha Lips [id number] [phone]  
  
   
 [place] [address] [place] [address] 84 [id number] [address] 6/13/2019 10:00 AM  
Follow Up with Mikie Garcia MD  
[facility] [address] [address] [facility]) Appt Note: Follow up [address] [facility] [phone] [facility] [address]  
[address] [facility] [address] Upcoming Health Maintenance Date Due DTaP/Tdap/Td series (1 - Tdap) 8/19/1962 ZOSTER VACCINE AGE 60> 6/19/2001 GLAUCOMA SCREENING Q2Y 8/19/2006 Bone Densitometry (Dexa) Screening 8/19/2006 Pneumococcal 65+ Low/Medium Risk (1 of 2 - PCV13) 8/19/2006 Influenza Age 5 to Adult 8/1/2018 Allergies as of 6/14/2018  Review Complete On: 6/14/2018 By: Tomasa Pelletier LPN Severity Noted Reaction Type Reactions Codeine  10/24/2012    Rash Demerol (Pf) [Meperidine (Pf)]  10/24/2012    Rash Current Immunizations  Never Reviewed No immunizations on file. Not reviewed this visit You Were Diagnosed With   
  
 Codes Comments [facility]    -  Primary ICD-10-CM: Z94.4 ICD-9-CM: V42.7 Vitals BP Pulse Temp Height(growth percentile) Weight(growth percentile) SpO2  
 117/59 (BP 1 Location: Left arm, BP Patient Position: Sitting) 73 97 °F (36.1 °C) (Tympanic) 5' 3\" (1.6 m) 157 lb (71.2 kg) 96% BMI OB Status Smoking Status 27.81 kg/m2 Postmenopausal Never Smoker Vitals History BMI and BSA Data Body Mass Index Body Surface Area  
 27.81 kg/m 2 1.78 m 2 Preferred Pharmacy Pharmacy Name Phone Whit Rosario, [facility] [phone] Your Updated Medication List  
  
   
This list is accurate as of 6/14/18 11:49 AM.  Always use your most recent med list.  
  
  
  
  
 azaTHIOprine 50 mg tablet Commonly known as:  The [employer] Take 2 Tabs by mouth daily. bumetanide 1 mg tablet Commonly known as:  Veleria Mungo Take  by mouth daily. * gabapentin 100 mg capsule Commonly known as:  NEURONTIN One daily for a total of 900mg in the morning. * gabapentin 400 mg capsule Commonly known as:  NEURONTIN  
TAKE TWO CAPSULES BY MOUTH THREE TIMES A DAY MAGNESIUM-CALCIUM-FOLIC ACID PO Take  by mouth.  
  
 multivitamin tablet Commonly known as:  ONE A DAY Take 1 Tab by mouth daily. Omeprazole delayed release 20 mg tablet Commonly known as:  PRILOSEC D/R Take 20 mg by mouth two (2) times a day. potassium chloride SR 10 mEq tablet Commonly known as:  KLOR-CON 10 Take 90 mEq by mouth daily. spironolactone 25 mg tablet Commonly known as:  ALDACTONE Take  by mouth daily. tacrolimus 1 mg capsule Commonly known as:  PROGRAF  
TAKE 1 CAPSULE TWO TIMES A DAY ULTRA SAMUEL PO Take  by mouth. WARFARIN PO Take  by mouth. 1mg/2mg/5mg * Notice: This list has 2 medication(s) that are the same as other medications prescribed for you. Read the directions carefully, and ask your doctor or other care provider to review them with you. To-Do List   
 06/14/2018 Lab:  AFP WITH AFP-L3%   
  
 06/14/2018 Lab:  CBC W/O DIFF   
  
 06/14/2018 Lab:  HEPATIC FUNCTION PANEL   
  
 06/14/2018 Lab:  MAGNESIUM   
  
 06/14/2018 Lab: METABOLIC PANEL, BASIC   
  
 06/14/2018 Lab:  PROTHROMBIN TIME + INR   
  
 06/14/2018 Lab:  TACROLIMUS, WHOLE BLOOD Patient Instructions Please contact your GYN to coordinate a Bone Density screening. As a reminder, please have your labs repeated in 11/2018. Introducing [facility]! Dear Chacorta Londono: 
Thank you for requesting a [employer] account. Our records indicate that you have previously registered for a [employer] account but its currently inactive. Please call our [employer] support line at [phone]. Additional Information If you have questions, please visit the Frequently Asked Questions section of the [employer] website at [email or web address] Remember, Most[employer] is NOT to be used for urgent needs. For medical emergencies, dial 911. Now available from your iPhone and Android! Please provide this summary of care documentation to your next provider. Your primary care clinician is listed as Zhanna Ge. If you have any questions after today's visit, please call [phone].

## 2018-06-14 NOTE — PATIENT INSTRUCTIONS
Please contact your GYN to coordinate a Bone Density screening. As a reminder, please have your labs repeated in 11/2018.

## 2018-11-15 LAB
AFP L3 MFR SERPL: NORMAL % (ref 0–9.9)
AFP SERPL-MCNC: 2.7 NG/ML (ref 0–8)
ALBUMIN SERPL-MCNC: 4.5 G/DL (ref 3.5–4.8)
ALP SERPL-CCNC: 85 IU/L (ref 39–117)
ALT SERPL-CCNC: 20 IU/L (ref 0–32)
AST SERPL-CCNC: 25 IU/L (ref 0–40)
BILIRUB DIRECT SERPL-MCNC: 0.14 MG/DL (ref 0–0.4)
BILIRUB SERPL-MCNC: 0.4 MG/DL (ref 0–1.2)
BUN SERPL-MCNC: 20 MG/DL (ref 8–27)
BUN/CREAT SERPL: 20 (ref 12–28)
CALCIUM SERPL-MCNC: 9.3 MG/DL (ref 8.7–10.3)
CHLORIDE SERPL-SCNC: 100 MMOL/L (ref 96–106)
CO2 SERPL-SCNC: 27 MMOL/L (ref 20–29)
CREAT SERPL-MCNC: 1.02 MG/DL (ref 0.57–1)
ERYTHROCYTE [DISTWIDTH] IN BLOOD BY AUTOMATED COUNT: 16 % (ref 12.3–15.4)
GLUCOSE SERPL-MCNC: 98 MG/DL (ref 65–99)
HCT VFR BLD AUTO: 30.2 % (ref 34–46.6)
HGB BLD-MCNC: 9.9 G/DL (ref 11.1–15.9)
INR PPP: 1.7 (ref 0.8–1.2)
MAGNESIUM SERPL-MCNC: 2 MG/DL (ref 1.6–2.3)
MCH RBC QN AUTO: 31.1 PG (ref 26.6–33)
MCHC RBC AUTO-ENTMCNC: 32.8 G/DL (ref 31.5–35.7)
MCV RBC AUTO: 95 FL (ref 79–97)
PLATELET # BLD AUTO: 173 X10E3/UL (ref 150–379)
POTASSIUM SERPL-SCNC: 4.4 MMOL/L (ref 3.5–5.2)
PROT SERPL-MCNC: 6.4 G/DL (ref 6–8.5)
PROTHROMBIN TIME: 17 SEC (ref 9.1–12)
RBC # BLD AUTO: 3.18 X10E6/UL (ref 3.77–5.28)
SODIUM SERPL-SCNC: 141 MMOL/L (ref 134–144)
WBC # BLD AUTO: 5.4 X10E3/UL (ref 3.4–10.8)

## 2018-11-16 LAB — TACROLIMUS, 700249: 2.5 NG/ML (ref 2–20)

## 2018-12-06 ENCOUNTER — PATIENT OUTREACH (OUTPATIENT)
Dept: HEMATOLOGY | Age: 78
End: 2018-12-06

## 2018-12-06 DIAGNOSIS — Z94.4 H/O LIVER TRANSPLANT (HCC): Primary | ICD-10-CM

## 2018-12-06 NOTE — PROGRESS NOTES
Phone call placed to patient. Notified of stable labs. Advised patient to have labs repeated in 2/2019. Mailed requisition to patient.

## 2019-01-29 RX ORDER — TACROLIMUS 1 MG/1
CAPSULE ORAL
Qty: 180 CAP | Refills: 3 | Status: SHIPPED | OUTPATIENT
Start: 2019-01-29 | End: 2020-01-24

## 2019-02-01 DIAGNOSIS — Z94.4 H/O LIVER TRANSPLANT (HCC): ICD-10-CM

## 2019-02-11 LAB
ERYTHROCYTE [DISTWIDTH] IN BLOOD BY AUTOMATED COUNT: 15.8 % (ref 12.3–15.4)
HCT VFR BLD AUTO: 31.5 % (ref 34–46.6)
HGB BLD-MCNC: 10.7 G/DL (ref 11.1–15.9)
MCH RBC QN AUTO: 30.7 PG (ref 26.6–33)
MCHC RBC AUTO-ENTMCNC: 34 G/DL (ref 31.5–35.7)
MCV RBC AUTO: 90 FL (ref 79–97)
PLATELET # BLD AUTO: 158 X10E3/UL (ref 150–379)
RBC # BLD AUTO: 3.49 X10E6/UL (ref 3.77–5.28)
WBC # BLD AUTO: 4.6 X10E3/UL (ref 3.4–10.8)

## 2019-02-12 LAB
ALBUMIN SERPL-MCNC: 4.8 G/DL (ref 3.5–4.8)
ALP SERPL-CCNC: 68 IU/L (ref 39–117)
ALT SERPL-CCNC: 30 IU/L (ref 0–32)
AST SERPL-CCNC: 36 IU/L (ref 0–40)
BILIRUB DIRECT SERPL-MCNC: 0.17 MG/DL (ref 0–0.4)
BILIRUB SERPL-MCNC: 0.5 MG/DL (ref 0–1.2)
BUN SERPL-MCNC: 14 MG/DL (ref 8–27)
BUN/CREAT SERPL: 14 (ref 12–28)
CALCIUM SERPL-MCNC: 9.4 MG/DL (ref 8.7–10.3)
CHLORIDE SERPL-SCNC: 95 MMOL/L (ref 96–106)
CO2 SERPL-SCNC: 26 MMOL/L (ref 20–29)
CREAT SERPL-MCNC: 0.97 MG/DL (ref 0.57–1)
GLUCOSE SERPL-MCNC: 94 MG/DL (ref 65–99)
MAGNESIUM SERPL-MCNC: 1.8 MG/DL (ref 1.6–2.3)
POTASSIUM SERPL-SCNC: 4.4 MMOL/L (ref 3.5–5.2)
PROT SERPL-MCNC: 6.6 G/DL (ref 6–8.5)
SODIUM SERPL-SCNC: 137 MMOL/L (ref 134–144)
TACROLIMUS, 700249: 6.4 NG/ML (ref 2–20)

## 2019-03-28 ENCOUNTER — PATIENT OUTREACH (OUTPATIENT)
Dept: HEMATOLOGY | Age: 79
End: 2019-03-28

## 2019-03-28 DIAGNOSIS — Z94.4 H/O LIVER TRANSPLANT (HCC): Primary | ICD-10-CM

## 2019-03-28 NOTE — PROGRESS NOTES
Phone call placed to patient. Received VM. Left message notifying patient most recent labs are stable. Advised patient she should repeat labs in 6/2019, prior to appointment with Dr. Jolanta Jaramillo on 6/13/2019. Notified patient a lab requisition would be mailed to her. NN name and contact information provided. Lab orders placed and requisition mailed to patient.

## 2019-04-09 ENCOUNTER — OFFICE VISIT (OUTPATIENT)
Dept: NEUROLOGY | Age: 79
End: 2019-04-09

## 2019-04-09 VITALS
OXYGEN SATURATION: 96 % | RESPIRATION RATE: 20 BRPM | SYSTOLIC BLOOD PRESSURE: 128 MMHG | HEART RATE: 83 BPM | BODY MASS INDEX: 27.81 KG/M2 | HEIGHT: 63 IN | DIASTOLIC BLOOD PRESSURE: 78 MMHG

## 2019-04-09 DIAGNOSIS — G52.1 GLOSSOPHARYNGEAL NEURALGIA: ICD-10-CM

## 2019-04-09 RX ORDER — GABAPENTIN 400 MG/1
CAPSULE ORAL
Qty: 540 CAP | Refills: 3 | Status: SHIPPED | OUTPATIENT
Start: 2019-04-09 | End: 2019-12-06 | Stop reason: SDUPTHER

## 2019-04-09 RX ORDER — GABAPENTIN 100 MG/1
CAPSULE ORAL
Qty: 180 CAP | Refills: 3 | Status: SHIPPED | OUTPATIENT
Start: 2019-04-09 | End: 2019-12-06 | Stop reason: SDUPTHER

## 2019-04-09 NOTE — PROGRESS NOTES
Date:  19     Name:  Maxine Jules  :  1940  MRN:  [id number]     PCP:  Deanna Ramos MD    Chief Complaint   Patient presents with    Follow-up     glossopharyngeal neuralgia        HISTORY OF PRESENT ILLNESS: Follow up for glossopharyngeal neuralgia and essential tremor. She does not notice the neuralgia as long as she takes the gabapentin. The tremor has been a bit more noticeable particularly when she is typing but it really does not interfere with anything that she wants to do. When this occurs, she wanted to know if it would be okay to take an extra gabapentin. Except as noted above, denies  fever, chills, cough. No CP or SOB. No dysuria, loss of bowel or bladder control. No Weight loss. Appetite good. Sleeping well. No sweats. No edema. No bruising or bleeding. No nausea or vomit. No diarrhea. No frequency, urgency, No depressive sxs. No anxiety. Denies sore throat, nasal congestion, nasal discharge, epistaxis, tinnitus, hearing loss, back pain, muscle pain, or joint pain. Current Outpatient Medications   Medication Sig    POTASSIUM CITRATE PO Take 99 mg by mouth.  gabapentin (NEURONTIN) 100 mg capsule One daily for a total of 900mg in the morning and then one po as needed for tremor    gabapentin (NEURONTIN) 400 mg capsule TAKE TWO CAPSULES BY MOUTH THREE TIMES A DAY    tacrolimus (PROGRAF) 1 mg capsule TAKE 1 CAPSULE TWO TIMES A DAY    multivitamin (ONE A DAY) tablet Take 1 Tab by mouth daily.  azaTHIOprine (IMURAN) 50 mg tablet Take 2 Tabs by mouth daily.  bumetanide (BUMEX) 1 mg tablet Take  by mouth daily.  CALCIUM CARBONATE/MAG CARB/FA (MAGNESIUM-CALCIUM-FOLIC ACID PO) Take  by mouth.  spironolactone (ALDACTONE) 25 mg tablet Take  by mouth daily.  ALIPOIC ACID/BIOFLAV/MV/GR TEA (ULTRA SAMUEL PO) Take  by mouth.  Omeprazole delayed release (PRILOSEC D/R) 20 mg tablet Take 20 mg by mouth two (2) times a day.       WARFARIN SODIUM (WARFARIN PO) Take  by mouth. 1mg/2mg/5mg     potassium chloride SR (KLOR-CON 10) 10 mEq tablet Take 90 mEq by mouth daily. No current facility-administered medications for this visit.       Allergies   Allergen Reactions    Codeine Rash    Demerol (Pf) [Meperidine (Pf)] Rash     Past Medical History:   Diagnosis Date    Atrial fib/flutter, transient     Benign meningioma of brain (HCC)     Leg swelling     Liver disease     Memory loss     Skipped beats     SOB (shortness of breath)      Past Surgical History:   Procedure Laterality Date    ABDOMEN SURGERY PROC UNLISTED      CARDIAC SURG PROCEDURE UNLIST  04/30/2013    valve replacement    HX ORTHOPAEDIC       Social History     Socioeconomic History    Marital status:      Spouse name: Not on file    Number of children: Not on file    Years of education: Not on file    Highest education level: Not on file   Occupational History    Not on file   Social Needs    Financial resource strain: Not on file    Food insecurity:     Worry: Not on file     Inability: Not on file    Transportation needs:     Medical: Not on file     Non-medical: Not on file   Tobacco Use    Smoking status: Never Smoker    Smokeless tobacco: Never Used   Substance and Sexual Activity    Alcohol use: Yes     Comment: social, wine    Drug use: No    Sexual activity: Not on file   Lifestyle    Physical activity:     Days per week: Not on file     Minutes per session: Not on file    Stress: Not on file   Relationships    Social connections:     Talks on phone: Not on file     Gets together: Not on file     Attends Cheondoism service: Not on file     Active member of club or organization: Not on file     Attends meetings of clubs or organizations: Not on file     Relationship status: Not on file    Intimate partner violence:     Fear of current or ex partner: Not on file     Emotionally abused: Not on file     Physically abused: Not on file     Forced sexual activity: Not on file   Other Topics Concern    Not on file   Social History Narrative    Not on file     Family History   Problem Relation Age of Onset    Heart Disease Father        PHYSICAL EXAMINATION:    Visit Vitals  /78   Pulse 83   Resp 20   Ht 5' 3\" (1.6 m)   SpO2 96%   BMI 27.81 kg/m²     General: Well defined, nourished, and groomed individual in no acute distress.    Neck: Supple, nontender, no bruits, no pain with resistance to active range of motion.    Heart: Regular rate and rhythm, no murmurs, rub, or gallop. Normal S1S2. Lungs: Clear to auscultation bilaterally with equal chest expansion, no cough, no wheeze  Musculoskeletal: Extremities revealed no edema and had full range of motion of joints.    Psych: Good mood and bright affect     NEUROLOGICAL EXAMINATION:    Mental Status: Alert and oriented to person, place, and time     Cranial Nerves:    II, III, IV, VI: Visual acuity grossly intact. Visual fields are normal.    Pupils are equal, round, and reactive to light and accommodation.    Extra-ocular movements are full and fluid. Fundoscopic exam was benign, no ptosis or nystagmus.    V-XII: Hearing is grossly intact. Facial features are symmetric, with normal sensation and strength. The palate rises symmetrically and the tongue protrudes midline. Sternocleidomastoids 5/5.      Motor Examination: Normal tone, bulk, and strength, 5/5 muscle strength throughout.      Coordination: Finger to nose was normal. No resting or intention tremor     Gait and Station: Steady while walking. Normal arm swing. No pronator drift. No muscle wasting or fasiculations noted.      Reflexes: DTRs 2+ throughout. ASSESSMENT AND PLAN    ICD-10-CM ICD-9-CM    1. Glossopharyngeal neuralgia G52.1 352.1 gabapentin (NEURONTIN) 100 mg capsule      gabapentin (NEURONTIN) 400 mg capsule     Review of PMH does not reveal any new diagnosis, medications, or surgeries. Review of systems was negative.  Neuralgia and essential tremor are stable on present therapy without side effect. She does noted it a little on occasion so she can take an extra 100mg of the gabapentin as needed. With regard to the eye issue, she will continue with her ophthalmologist for now. However, if there are no findings to explain her left eye discomfort, then we can always set her up with neuro-ophthalmology. She will let me know if this become necessary. Follow up yearly or sooner if needed. Carissa Butts

## 2019-04-18 RX ORDER — AZATHIOPRINE 50 MG/1
100 TABLET ORAL DAILY
Qty: 180 TAB | Refills: 3 | Status: SHIPPED | OUTPATIENT
Start: 2019-04-18 | End: 2020-04-22 | Stop reason: SDUPTHER

## 2019-04-18 NOTE — TELEPHONE ENCOUNTER
Received VM from patient who reports she has about 2 weeks left of Imuran and is requesting a refill. Return phone call placed to patient.  Advised her medication refill request has been approved and sent to [facility] per her request.

## 2019-06-03 DIAGNOSIS — Z94.4 H/O LIVER TRANSPLANT (HCC): ICD-10-CM

## 2019-06-06 LAB
ALBUMIN SERPL-MCNC: 4.7 G/DL (ref 3.5–4.8)
ALP SERPL-CCNC: 69 IU/L (ref 39–117)
ALT SERPL-CCNC: 26 IU/L (ref 0–32)
AST SERPL-CCNC: 27 IU/L (ref 0–40)
BILIRUB DIRECT SERPL-MCNC: 0.15 MG/DL (ref 0–0.4)
BILIRUB SERPL-MCNC: 0.5 MG/DL (ref 0–1.2)
BUN SERPL-MCNC: 20 MG/DL (ref 8–27)
BUN/CREAT SERPL: 20 (ref 12–28)
CALCIUM SERPL-MCNC: 9.5 MG/DL (ref 8.7–10.3)
CHLORIDE SERPL-SCNC: 98 MMOL/L (ref 96–106)
CHOLEST SERPL-MCNC: 220 MG/DL (ref 100–199)
CO2 SERPL-SCNC: 28 MMOL/L (ref 20–29)
CREAT SERPL-MCNC: 0.98 MG/DL (ref 0.57–1)
ERYTHROCYTE [DISTWIDTH] IN BLOOD BY AUTOMATED COUNT: 15.8 % (ref 12.3–15.4)
GLUCOSE SERPL-MCNC: 103 MG/DL (ref 65–99)
HCT VFR BLD AUTO: 30.7 % (ref 34–46.6)
HDLC SERPL-MCNC: 62 MG/DL
HGB BLD-MCNC: 10.6 G/DL (ref 11.1–15.9)
INR PPP: 1.7 (ref 0.8–1.2)
LDLC SERPL CALC-MCNC: 144 MG/DL (ref 0–99)
MCH RBC QN AUTO: 31.5 PG (ref 26.6–33)
MCHC RBC AUTO-ENTMCNC: 34.5 G/DL (ref 31.5–35.7)
MCV RBC AUTO: 91 FL (ref 79–97)
PLATELET # BLD AUTO: 175 X10E3/UL (ref 150–450)
POTASSIUM SERPL-SCNC: 4.2 MMOL/L (ref 3.5–5.2)
PROT SERPL-MCNC: 6.5 G/DL (ref 6–8.5)
PROTHROMBIN TIME: 17.1 SEC (ref 9.1–12)
RBC # BLD AUTO: 3.36 X10E6/UL (ref 3.77–5.28)
SODIUM SERPL-SCNC: 140 MMOL/L (ref 134–144)
TRIGL SERPL-MCNC: 70 MG/DL (ref 0–149)
VLDLC SERPL CALC-MCNC: 14 MG/DL (ref 5–40)
WBC # BLD AUTO: 4.4 X10E3/UL (ref 3.4–10.8)

## 2019-06-07 LAB — TACROLIMUS, 700249: 1.4 NG/ML (ref 2–20)

## 2019-06-13 ENCOUNTER — OFFICE VISIT (OUTPATIENT)
Dept: HEMATOLOGY | Age: 79
End: 2019-06-13

## 2019-06-13 VITALS
WEIGHT: 161.8 LBS | TEMPERATURE: 97.6 F | RESPIRATION RATE: 15 BRPM | SYSTOLIC BLOOD PRESSURE: 117 MMHG | DIASTOLIC BLOOD PRESSURE: 62 MMHG | HEART RATE: 73 BPM | OXYGEN SATURATION: 95 % | HEIGHT: 63 IN | BODY MASS INDEX: 28.67 KG/M2

## 2019-06-13 DIAGNOSIS — Z94.4 H/O LIVER TRANSPLANT (HCC): Primary | ICD-10-CM

## 2019-06-13 NOTE — PROGRESS NOTES
[facility], MD, MD Anel Grimm, PAPeterC    Ck Zimmer, [facility]-BC     April Azucena Cheema, NP   Kendall Cruz, PARDEEP Kerr, PARDEEP Faulkner [facility] 136    at [address], [address] [place] [address], [facility] 22.    [phone]    FAX: [address] [place] [address], [address], [address]    [phone]    FAX: [phone]       Patient Care Team:  Anthony Galeana MD as PCP - General (Family Practice)  Nydia Thao MD (Family Practice)  Elizabeth Cesar MD (Cardiology)      Problem List  Date Reviewed: 6/15/2018          Codes Class Noted    Long term current use of immunosuppressive drug ICD-10-CM: Z79.899  ICD-9-CM: V58.69  3/27/2017        Liver transplant complication ([facility] 75.) AJN-01-RO: T86.40  ICD-9-CM: 996.82  6/55/6363        Diastolic congestive heart failure ([facility].) ICD-10-CM: I50.30  ICD-9-CM: 428.30, 428.0  9/29/2016        H/O liver transplant [facility]) ICD-10-CM: Z94.4  ICD-9-CM: V42.7  8/17/2016        S/P cataract surgery ICD-10-CM: Z98.49  ICD-9-CM: V45.61  8/17/2016        Osteoporosis ICD-10-CM: M81.0  ICD-9-CM: 733.00  8/17/2016        H/O cervical spine surgery ICD-10-CM: Z98.890  ICD-9-CM: V45.89  8/17/2016        Basal cell carcinoma ICD-10-CM: C44.91  ICD-9-CM: 173.91  8/17/2016    Overview Signed 8/17/2016 11:28 AM by Gauri Martinez MD     Multiple locations, face, back, neck, legs             S/P aortic valve replacement ICD-10-CM: Z95.2  ICD-9-CM: V43.3  8/17/2016        Pacemaker ICD-10-CM: Z95.0  ICD-9-CM: V45.01  8/17/2016        Essential and other specified forms of tremor ICD-10-CM: G25.0, G25.2  ICD-9-CM: 333.1  8/12/2013        Meningioma ([facility] ICD-10-CM: D32.9  ICD-9-CM: 225.2  8/12/2013        Leg swelling ICD-10-CM: M79.89  ICD-9-CM: 729.81  Unknown        Atrial fib/flutter, transient ICD-9-CM: [id number]  Unknown        Glossopharyngeal neuralgia ICD-10-CM: G52.1  ICD-9-CM: 352.1  10/24/2012              Modesto Madden returns to the [address] for management of liver graft function and immune suppression following a liver transplant. The active problem list, all pertinent past medical history, medications, radiologic findings and laboratory findings related to the liver disorder were reviewed with the patient. The patient is a 69 y.o.  female who underwent a liver transplant in 2003 at [facility] for cirrhosis and hepatocellular carcinoma. She does not know why she developed cirrhosis. She stated that she had hepatitis since the age of 16 years. She was followed closely at [facility] after the LT for 3 years. She then moved to [place], was there for 5 years and then moved to [place] in 2011. She has not had any contact with the [facility] LT program since 2001. The patient has no symptoms which could be attributed to the liver disorder. The patient completes all daily activities without any functional limitations. The patient has not experienced problems concentrating, swelling of the abdomen, swelling of the lower extremities, hematemesis, hematochezia. All of the issues listed in the Assessment and Plan were discussed with the patient. All questions were answered. The patient expressed a clear understanding of the above. ASSESSMENT AND PLAN:  Liver transplant   This was performed at [facility] in 2003. The liver transaminases are normal.  The ALP is normal.  The liver function is normal.  The platelet count is normal.  There has not been any episodes of graft rejection. Laboratory studies including a tacrolimus level will be performed every 6 months.       Immune Suppression  Tacrolimus is being well tolerated with no side effects. The TAC blood level is 1.4  which is adequate given that she is now 15 years out from the LT and has normal liver enzymes. The tacrolimus lower level was due to a longer trough time, nighttime dose was taken early. Will continue the current dose of TC at 1 mg BID. Imuran is being tolerated well tolerated with no side effects. Will continue at current dose 100 mg QD    Hepatocellular carcinoma  This was present in native liver prior to undergoing liver transplant. There has been no recurrence to date based upon imaging. No further screening for recurrent HCC is needed since she is 15 years out from the LT. Chronic kidney disease  This is common in liver transplant recipients and is a common side effect of immune suppression with calcineurin agents. There is no evidence of CKD at this time. NSAIDs should not be utilized as this may worsen CKD. Hypercholesterolemia   This is a common side effect of immune supression. She does not have hypercholesterolemia    Hypertension  This is a common side effect of immune suppression with calcineurin agents. She does not have hypertension at this time. Treatment of other medical problems in patients with chronic liver disease  There are no contraindications for the patient to take any medications that are necessary for treatment of other medical issues. The patient does not consume alcohol daily. Normal doses of acetaminophen can be used for pain as needed. Counseling for alcohol in patients with chronic liver disease  The patient was counseled regarding alcohol consumption and the effect of alcohol on chronic liver disease. The patient has normal liver function. She can consume an occasional alcoholic beverage,      Multiple skin cancers   The patient has developed multiple skin cancers. Skin cancers are common after liver transplant due to chronic immujne suppression.   She should continue to be monitored by Dermatology. Osteoporosis  The risk of osteoporosis is increased in following liver transplantation. DEXA bone density to assess for osteoporosis has been ordered. Hypomagnesia  Low serum magnesium can be caused by immune suppression. Will check serum mag and provide oral supplement if low. Vaccinations   Routine vaccinations against other bacterial and viral agents can be performed as long as live vaccines are not utilizied. Annual flu vaccination should be administered if indicated. Allergies   Allergen Reactions    Codeine Rash    Demerol (Pf) [Meperidine (Pf)] Rash       Current Outpatient Medications   Medication Sig    azaTHIOprine (IMURAN) 50 mg tablet Take 2 Tabs by mouth daily.  POTASSIUM CITRATE PO Take 99 mg by mouth.  gabapentin (NEURONTIN) 100 mg capsule One daily for a total of 900mg in the morning and then one po as needed for tremor    gabapentin (NEURONTIN) 400 mg capsule TAKE TWO CAPSULES BY MOUTH THREE TIMES A DAY    tacrolimus (PROGRAF) 1 mg capsule TAKE 1 CAPSULE TWO TIMES A DAY    multivitamin (ONE A DAY) tablet Take 1 Tab by mouth daily.  bumetanide (BUMEX) 1 mg tablet Take  by mouth daily.  potassium chloride SR (KLOR-CON 10) 10 mEq tablet Take 90 mEq by mouth daily.  CALCIUM CARBONATE/MAG CARB/FA (MAGNESIUM-CALCIUM-FOLIC ACID PO) Take  by mouth.  spironolactone (ALDACTONE) 25 mg tablet Take  by mouth daily.  ALIPOIC ACID/BIOFLAV/MV/GR TEA (ULTRA SAMUEL PO) Take  by mouth.  Omeprazole delayed release (PRILOSEC D/R) 20 mg tablet Take 20 mg by mouth two (2) times a day.  WARFARIN SODIUM (WARFARIN PO) Take  by mouth. 1mg/2mg/5mg      No current facility-administered medications for this visit. SYSTEM REVIEW NOT RELATED TO LIVER DISEASE OR REVIEWED ABOVE:  Constitution systems: Negative for fever, chills, weight gain, weight loss. Eyes: Negative for visual changes. ENT: Negative for sore throat, painful swallowing.    Respiratory: Negative for cough, hemoptysis, SOB. Cardiology: Heart valve surgery/repalcement. No SOB. No TAO. GI:  Negative for constipation or diarrhea. : Negative for urinary frequency, dysuria, hematuria, nocturia. Skin: Multiple skin cancers on the arms, legs, face. Hematology: Negative for easy bruising, blood clots. Musculo-skelatal: Negative for back pain, muscle pain, weakness. Neurologic: Negative for headaches, dizziness, vertigo, memory problems not related to HE. Psychology: Negative for anxiety, depression. FAMILY HISTORY:  There is no family history of liver disease  There is no family history of heart disease    SOCIAL HISTORY:  The patient is . The patient has 3 children, 3 stepchildren, and 10 grandchildren, 1 great grandchild. The patient has never used tobacco products. The patient has never consumed significant amounts of alcohol. The patient used to work as in many different jobs. PHYSICAL EXAMINATION:  Visit Vitals  /62 (BP 1 Location: Left arm, BP Patient Position: Sitting)   Pulse 73   Temp 97.6 °F (36.4 °C) (Tympanic)   Resp 15   Ht 5' 3\" (1.6 m)   Wt 161 lb 12.8 oz (73.4 kg)   SpO2 95%   BMI 28.66 kg/m²     General: No acute distress. Eyes: Sclera anicteric. ENT: No oral lesions. Thyroid normal.  Nodes: No adenopathy. Skin: Multiple scars from skin cancer surgeries. Respiratory: Lungs clear to auscultation. Cardiovascular: Regular heart rate. Murmur with systolic click related to heart valve replacement/pacemaker. Abdomen: Soft non-tender. Liver size normal to percussion/palpation. Spleen not palpable. No obvious ascites. Extremities: No edema. No muscle wasting. Myalgias of lower extremities. Neurologic: Alert and oriented. Cranial nerves grossly intact. No asterixis.       LABORATORY STUDIES:  Liver [place] of [address] & Units 6/5/2019 2/11/2019   WBC 3.4 - 10.8 x10E3/uL 4.4 4.6   ANC 1.4 - 7.0 x10E3/uL HGB 11.1 - 15.9 g/dL 10.6 (L) 10.7 (L)    - 450 x10E3/uL 175 158   INR 0.8 - 1.2 1.7 (H)    AST 0 - 40 IU/L 27 36   ALT 0 - 32 IU/L 26 30   Alk Phos 39 - 117 IU/L 69 68   Bili, Total 0.0 - 1.2 mg/dL 0.5 0.5   Bili, Direct 0.00 - 0.40 mg/dL 0.15 0.17   Albumin 3.5 - 4.8 g/dL 4.7 4.8   BUN 8 - 27 mg/dL 20 14   Creat 0.57 - 1.00 mg/dL 0.98 0.97   Creat (iSTAT) 0.6 - 1.3 MG/DL     Na 134 - 144 mmol/L 140 137   K 3.5 - 5.2 mmol/L 4.2 4.4   Cl 96 - 106 mmol/L 98 95 (L)   CO2 20 - 29 mmol/L 28 26   Glucose 65 - 99 mg/dL 103 (H) 94   Magnesium 1.6 - 2.3 mg/dL  1.8     Liver Virology and Transplant Immune Suppression Tacrolimus Level   Latest Ref Rng & Units 2.0 - 20.0 ng/mL   6/5/2019 1.4 (L)   2/11/2019 6.4   11/14/2018 2.5   5/2/2018 3.2       SEROLOGIES:  Not available or performed. Testing was performed today. LIVER HISTOLOGY:  Not available or performed    ENDOSCOPIC PROCEDURES:  Not available or performed    RADIOLOGY:  Not available or performed    OTHER TESTING:  Not available or performed    Return to the liver institute in 1 year. Repeat laboratory testing with tacrolimus level in 6 months and again prior to next appointment. Schedule DEXA scan in 1-2 months. April ZUNILDA Gupta-BC  [facility] [place] of [address] [facility], [address] [place] [address], [address] [place], [place] [address]e  [phone]    SUPERVISING MD  I have personally interviewed and examined the patient during the encounter. I have explained the key findings related to the liver disease and other pertinent diagnoses as noted above to the patient and answered all questions. I have reviewed and agree with the findings documented above, including all diagnostic interpretations, and plans. I have edited the original note as appropriate for clarity.     Blossom Sahni, MD [facility] [address] of [address] [place] [address] A, [address] [place] [address] 22.  [phone]  [address]

## 2019-06-13 NOTE — PROGRESS NOTES
Inder Felipe is a 69 y.o. female    Chief Complaint   Patient presents with    Follow-up     1. Have you been to the ER, urgent care clinic since your last visit? Hospitalized since your last visit? No    2. Have you seen or consulted any other health care providers outside of the [address] [place] [address] since your last visit? Include any pap smears or colon screening. No       Visit Vitals  /62 (BP 1 Location: Left arm, BP Patient Position: Sitting)   Pulse 73   Temp 97.6 °F (36.4 °C) (Tympanic)   Resp 15   Ht 5' 3\" (1.6 m)   Wt 161 lb 12.8 oz (73.4 kg)   SpO2 95%   BMI 28.66 kg/m²     Abuse Screening Questionnaire 6/13/2019   Do you ever feel afraid of your partner? N   Are you in a relationship with someone who physically or mentally threatens you? N   Is it safe for you to go home?  Y     3 most recent PHQ Screens 6/13/2019   Little interest or pleasure in doing things Not at all   Feeling down, depressed, irritable, or hopeless Not at all   Total Score PHQ 2 0

## 2019-06-13 NOTE — Clinical Note
8/4/19 Patient: Flaca Negrete YOB: 1940 Date of Visit: 6/13/2019 Juliano Flores MD 
[facility] 7 [address] VIA Facsimile: [phone] Dear Juliano Flores MD, Thank you for referring Ms. Suzanna Harrell to [address] [place] [address],[address] for evaluation. My notes for this consultation are attached. If you have questions, please do not hesitate to call me. I look forward to following your patient along with you. Sincerely, Snow Yu MD

## 2019-07-25 ENCOUNTER — HOSPITAL ENCOUNTER (OUTPATIENT)
Dept: MAMMOGRAPHY | Age: 79
Discharge: HOME | End: 2019-07-25
Attending: NURSE PRACTITIONER
Payer: MEDICARE

## 2019-07-25 DIAGNOSIS — Z94.4 H/O LIVER TRANSPLANT (HCC): ICD-10-CM

## 2019-07-25 PROCEDURE — 77080 DXA BONE DENSITY AXIAL: CPT

## 2019-12-06 DIAGNOSIS — G52.1 GLOSSOPHARYNGEAL NEURALGIA: ICD-10-CM

## 2019-12-09 RX ORDER — GABAPENTIN 400 MG/1
CAPSULE ORAL
Qty: 540 CAP | Refills: 1 | Status: SHIPPED | OUTPATIENT
Start: 2019-12-09 | End: 2020-04-23 | Stop reason: SDUPTHER

## 2019-12-09 RX ORDER — GABAPENTIN 100 MG/1
CAPSULE ORAL
Qty: 180 CAP | Refills: 1 | Status: SHIPPED | OUTPATIENT
Start: 2019-12-09 | End: 2020-04-23 | Stop reason: SDUPTHER

## 2019-12-13 DIAGNOSIS — Z94.4 H/O LIVER TRANSPLANT (HCC): ICD-10-CM

## 2020-01-10 LAB
BASOPHILS # BLD AUTO: 0 X10E3/UL (ref 0–0.2)
BASOPHILS NFR BLD AUTO: 0 %
EOSINOPHIL # BLD AUTO: 0 X10E3/UL (ref 0–0.4)
EOSINOPHIL NFR BLD AUTO: 0 %
ERYTHROCYTE [DISTWIDTH] IN BLOOD BY AUTOMATED COUNT: 14.8 % (ref 11.7–15.4)
HCT VFR BLD AUTO: 29.2 % (ref 34–46.6)
HGB BLD-MCNC: 10.3 G/DL (ref 11.1–15.9)
IMM GRANULOCYTES # BLD AUTO: 0 X10E3/UL (ref 0–0.1)
IMM GRANULOCYTES NFR BLD AUTO: 0 %
LYMPHOCYTES # BLD AUTO: 1.5 X10E3/UL (ref 0.7–3.1)
LYMPHOCYTES NFR BLD AUTO: 28 %
MCH RBC QN AUTO: 31.8 PG (ref 26.6–33)
MCHC RBC AUTO-ENTMCNC: 35.3 G/DL (ref 31.5–35.7)
MCV RBC AUTO: 90 FL (ref 79–97)
MONOCYTES # BLD AUTO: 0.4 X10E3/UL (ref 0.1–0.9)
MONOCYTES NFR BLD AUTO: 8 %
NEUTROPHILS # BLD AUTO: 3.2 X10E3/UL (ref 1.4–7)
NEUTROPHILS NFR BLD AUTO: 64 %
PLATELET # BLD AUTO: 186 X10E3/UL (ref 150–450)
RBC # BLD AUTO: 3.24 X10E6/UL (ref 3.77–5.28)
WBC # BLD AUTO: 5.1 X10E3/UL (ref 3.4–10.8)

## 2020-01-11 LAB
ALBUMIN SERPL-MCNC: 4.7 G/DL (ref 3.5–4.8)
ALP SERPL-CCNC: 74 IU/L (ref 39–117)
ALT SERPL-CCNC: 20 IU/L (ref 0–32)
AST SERPL-CCNC: 27 IU/L (ref 0–40)
BILIRUB DIRECT SERPL-MCNC: 0.18 MG/DL (ref 0–0.4)
BILIRUB SERPL-MCNC: 0.6 MG/DL (ref 0–1.2)
BUN SERPL-MCNC: 13 MG/DL (ref 8–27)
BUN/CREAT SERPL: 12 (ref 12–28)
CALCIUM SERPL-MCNC: 9.3 MG/DL (ref 8.7–10.3)
CHLORIDE SERPL-SCNC: 98 MMOL/L (ref 96–106)
CO2 SERPL-SCNC: 25 MMOL/L (ref 20–29)
CREAT SERPL-MCNC: 1.07 MG/DL (ref 0.57–1)
GLUCOSE SERPL-MCNC: 89 MG/DL (ref 65–99)
INR PPP: 2 (ref 0.8–1.2)
POTASSIUM SERPL-SCNC: 4.8 MMOL/L (ref 3.5–5.2)
PROT SERPL-MCNC: 6.6 G/DL (ref 6–8.5)
PROTHROMBIN TIME: 20.1 SEC (ref 9.1–12)
SODIUM SERPL-SCNC: 138 MMOL/L (ref 134–144)

## 2020-01-12 LAB — TACROLIMUS, 700249: 4.5 NG/ML (ref 2–20)

## 2020-01-13 NOTE — PROGRESS NOTES
Called patient and left a message regarding blood work results which are normal. We will see her back in June

## 2020-01-24 RX ORDER — TACROLIMUS 1 MG/1
CAPSULE ORAL
Qty: 180 CAP | Refills: 4 | Status: SHIPPED | OUTPATIENT
Start: 2020-01-24 | End: 2020-01-28

## 2020-01-28 RX ORDER — TACROLIMUS 1 MG/1
CAPSULE ORAL
Qty: 180 CAP | Refills: 4 | Status: SHIPPED | OUTPATIENT
Start: 2020-01-28 | End: 2020-02-01

## 2020-02-01 RX ORDER — TACROLIMUS 1 MG/1
CAPSULE ORAL
Qty: 180 CAP | Refills: 4 | Status: SHIPPED | OUTPATIENT
Start: 2020-02-01 | End: 2020-02-05

## 2020-02-05 RX ORDER — TACROLIMUS 1 MG/1
CAPSULE ORAL
Qty: 180 CAP | Refills: 4 | Status: SHIPPED | OUTPATIENT
Start: 2020-02-05 | End: 2020-03-06 | Stop reason: SDUPTHER

## 2020-02-19 ENCOUNTER — TELEPHONE (OUTPATIENT)
Dept: HEMATOLOGY | Age: 80
End: 2020-02-19

## 2020-02-19 NOTE — TELEPHONE ENCOUNTER
Received VM from patient stating she is trying to fill Tacrolimus, but it requires Prior Authorization. Contacted [employer] ([phone]) and provided clinical information for PA request patient had started. Reference #: [id number]    Informed patient PA request has been initiated and a decision should be reached within 72 hours. Asked patient to notify NN if there are further issues.

## 2020-03-06 RX ORDER — TACROLIMUS 1 MG/1
1 CAPSULE ORAL 2 TIMES DAILY
Qty: 180 CAP | Refills: 3 | Status: SHIPPED | OUTPATIENT
Start: 2020-03-06 | End: 2021-02-03

## 2020-03-06 NOTE — TELEPHONE ENCOUNTER
Received VM from patient stating she contacted the mail order pharmacy regarding the status of Tacrolimus and was advised it has not shipped and she must use "[employer]. Patient reports she is in [place] with her daughter who is critically ill and she has 1 more day of medication left. Phone call placed to [employer] ([phone]). Spoke with representative and confirmed e-prescription has been received. Explained the delay in patient receiving refill of medication and asked for expedited shipment. Phone call placed to patient. Received VM. Left message advising patient [facility] is supposed to overnight medication to her. Advised patient to contact [facility] in 2 hours if she has not been contacted by then. Asked her to notify NN if there are further issues.

## 2020-04-22 RX ORDER — AZATHIOPRINE 50 MG/1
100 TABLET ORAL DAILY
Qty: 180 TAB | Refills: 3 | Status: SHIPPED | OUTPATIENT
Start: 2020-04-22 | End: 2021-01-28

## 2020-04-27 ENCOUNTER — TELEPHONE (OUTPATIENT)
Dept: HEMATOLOGY | Age: 80
End: 2020-04-27

## 2020-04-27 NOTE — TELEPHONE ENCOUNTER
Pharmacy called regarding Imuran and Medicare B vs D determination that is needed before medication can be filled. Initiated determination request through Covermymeds.  Key: [id number]

## 2020-04-30 NOTE — TELEPHONE ENCOUNTER
Received VM from [address] [place] [address] representative requesting more information. Return phone call placed to [facility]. Spoke with representative and advised her Medicare B vs D determination request was initiated and approved on 4/27/2020. Representative verified claim went through. Pharmacy to contact patient and arrange for delivery.      PA Case: [id number], Status: Approved, Coverage Starts on: 1/27/2020 12:00:00 AM, Coverage Ends on: 4/27/2021 12:00:00 AM.

## 2020-06-09 ENCOUNTER — OFFICE VISIT (OUTPATIENT)
Dept: HEMATOLOGY | Age: 80
End: 2020-06-09

## 2020-06-09 VITALS
WEIGHT: 156 LBS | TEMPERATURE: 96.6 F | HEIGHT: 63 IN | OXYGEN SATURATION: 96 % | DIASTOLIC BLOOD PRESSURE: 66 MMHG | SYSTOLIC BLOOD PRESSURE: 144 MMHG | HEART RATE: 74 BPM | BODY MASS INDEX: 27.64 KG/M2

## 2020-06-09 DIAGNOSIS — Z79.899 LONG TERM CURRENT USE OF IMMUNOSUPPRESSIVE DRUG: ICD-10-CM

## 2020-06-09 DIAGNOSIS — C44.91 BASAL CELL CARCINOMA (BCC), UNSPECIFIED SITE: ICD-10-CM

## 2020-06-09 DIAGNOSIS — Z94.4 H/O LIVER TRANSPLANT (HCC): Primary | ICD-10-CM

## 2020-06-09 NOTE — PROGRESS NOTES
[facility], MD, MD Verna Parikh, JENNIFER Alberto, [facility]     April Maite Flores, NP   Lamont Rios, PARDEEP Kenny, PARDEEP [facility] [address]    at [facility]    [address] [facility], [address] [place] [address], [facility].    [phone]    FAX: [address] [place] [address], [address], [address]    [phone]    FAX: [phone]       Patient Care Team:  Yunior Sood MD as PCP - General (Family Practice)  Nina Orta MD (Family Practice)  Manuel Meyers MD (Cardiology)      Problem List  Date Reviewed: 8/4/2019          Codes Class Noted    Long term current use of immunosuppressive drug ICD-10-CM: Z79.899  ICD-9-CM: V58.69  3/27/2017        Liver transplant complication [facility]) DZG-31-WS: T86.40  ICD-9-CM: 996.82  9/94/7964        Diastolic congestive heart failure ([facility] [address] ICD-10-CM: I50.30  ICD-9-CM: 428.30, 428.0  9/29/2016        H/O liver transplant [facility]) ICD-10-CM: Z94.4  ICD-9-CM: V42.7  8/17/2016        S/P cataract surgery ICD-10-CM: Z98.49  ICD-9-CM: V45.61  8/17/2016        Osteoporosis ICD-10-CM: M81.0  ICD-9-CM: 733.00  8/17/2016        H/O cervical spine surgery ICD-10-CM: Z98.890  ICD-9-CM: V45.89  8/17/2016        Basal cell carcinoma ICD-10-CM: C44.91  ICD-9-CM: 173.91  8/17/2016    Overview Signed 8/17/2016 11:28 AM by Wale Wang MD     Multiple locations, face, back, neck, legs             S/P aortic valve replacement ICD-10-CM: Z95.2  ICD-9-CM: V43.3  8/17/2016        Pacemaker ICD-10-CM: Z95.0  ICD-9-CM: V45.01  8/17/2016        Essential and other specified forms of tremor ICD-10-CM: G25.0, G25.2  ICD-9-CM: 333.1  8/12/2013        Meningioma ([facility] ICD-10-CM: D32.9  ICD-9-CM: 225.2  8/12/2013        Leg swelling ICD-10-CM: M79.89  ICD-9-CM: 729.81  Unknown        Atrial fib/flutter, transient ICD-9-CM: [id number]  Unknown        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 The active problem list, all pertinent past medical history, medications, radiologic findings and laboratory findings related to the liver disorder were reviewed with the patient. The patient is a 67 y.o.  female who underwent a liver transplant in 2003 at [facility] for cirrhosis and hepatocellular carcinoma. She does not know why she developed cirrhosis. She stated that she had hepatitis since the age of 16 years. She was followed closely at [facility] after the LT for 3 years She then moved to [facility], was there for 5 years and then moved to [place] in 2011. She has not had any contact with the [facility] LT program since 2001. Since the last office visit the patient has had multiple skin cancers removed on chest, arms and face. The pathology is basal and squamous. The patient has no symptoms which could be attributed to the liver disorder. The patient completes all daily activities without any functional limitations. The patient has not experienced problems concentrating, swelling of the abdomen, swelling of the lower extremities, hematemesis, or hematochezia. All of the issues listed in the Assessment and Plan were discussed with the patient. All questions were answered. The patient expressed a clear understanding of the above. ASSESSMENT AND PLAN:  Liver transplant   This was performed at [facility] in 2003.   The liver transaminases are normal.  The ALP is normal.  The liver function is normal.  The platelet count is normal.  There has not been any episodes of graft rejection. Laboratory studies including a tacrolimus level will be performed every 6 months. It has been a year since the last blood work due to her daughter being ill and the Covid-19. Immune Suppression  Tacrolimus is being well tolerated with no side effects. The TAC blood level was 4.5 on 1/2020  which is adequate given that she is now 15 years out from the LT and has normal liver enzymes. Will continue the current dose of Tac at 1 mg BID. Imuran is being tolerated well tolerated with no side effects. Will continue at current dose 100 mg QD    Hepatocellular carcinoma  This was present in native liver prior to undergoing liver transplant. There has been no recurrence to date based upon imaging. No further screening for recurrent HCC is needed since she is 15 years out from the LT. Chronic kidney disease  This is common in liver transplant recipients and is a common side effect of immune suppression with calcineurin agents. There is no evidence of CKD at this time. NSAIDs should not be utilized as this may worsen CKD. Hypercholesterolemia   This is a common side effect of immune supression. She does not have hypercholesterolemia    Hypertension  This is a common side effect of immune suppression with calcineurin agents. She does not have hypertension at this time. Treatment of other medical problems in patients with chronic liver disease  There are no contraindications for the patient to take any medications that are necessary for treatment of other medical issues. The patient does not consume alcohol daily. Normal doses of acetaminophen can be used for pain as needed. Counseling for alcohol in patients with chronic liver disease  The patient was counseled regarding alcohol consumption and the effect of alcohol on chronic liver disease. The patient has normal liver function.   She can consume an occasional alcoholic beverage,      Multiple skin cancers   The patient has developed multiple skin cancers. Pathology has been basal and squamous. Skin cancers are common after liver transplant due to chronic immujne suppression. She should continue to be monitored by Dermatology. Colonoscopy  The patient reports doing a Cologuard screening 2-3 years ago. Advised she discuss with the PCP to repeat this. She does not want to have a colonoscopy. Osteoporosis  The risk of osteoporosis is increased in following liver transplantation. DEXA bone density was completed 7/2019 which demonstrated osteopenia. Repeat will be scheduled in 2 years. Hypomagnesia  Low serum magnesium can be caused by immune suppression. Will check serum mag and provide oral supplement if low. Vaccinations   Routine vaccinations against other bacterial and viral agents can be performed as long as live vaccines are not utilizied. Annual flu vaccination should be administered if indicated. Allergies   Allergen Reactions    Codeine Rash    Demerol (Pf) [Meperidine (Pf)] Rash       Current Outpatient Medications   Medication Sig    gabapentin (NEURONTIN) 100 mg capsule One daily for a total of 900mg in the morning and then one po as needed for tremor    gabapentin (NEURONTIN) 400 mg capsule TAKE TWO CAPSULES BY MOUTH THREE TIMES A DAY    azaTHIOprine (IMURAN) 50 mg tablet Take 2 Tabs by mouth daily.  tacrolimus (PROGRAF) 1 mg capsule Take 1 Cap by mouth two (2) times a day.  POTASSIUM CITRATE PO Take 99 mg by mouth.  multivitamin (ONE A DAY) tablet Take 1 Tab by mouth daily.  bumetanide (BUMEX) 1 mg tablet Take  by mouth daily.  potassium chloride SR (KLOR-CON 10) 10 mEq tablet Take 90 mEq by mouth daily.  CALCIUM CARBONATE/MAG CARB/FA (MAGNESIUM-CALCIUM-FOLIC ACID PO) Take  by mouth.  spironolactone (ALDACTONE) 25 mg tablet Take  by mouth daily.  ALIPOIC ACID/BIOFLAV/MV/GR TEA (ULTRA SAMUEL PO) Take  by mouth.     Omeprazole delayed release (PRILOSEC D/R) 20 mg tablet Take 20 mg by mouth two (2) times a day.  WARFARIN SODIUM (WARFARIN PO) Take  by mouth. 1mg/2mg/5mg      No current facility-administered medications for this visit. SYSTEM REVIEW NOT RELATED TO LIVER DISEASE OR REVIEWED ABOVE:  Constitution systems: Negative for fever, chills, weight gain, weight loss. Eyes: Negative for visual changes. ENT: Negative for sore throat, painful swallowing. Respiratory: Negative for cough, hemoptysis, SOB. Cardiology: Heart valve surgery/repalcement. No SOB. No TAO. GI:  Negative for constipation or diarrhea. : Negative for urinary frequency, dysuria, hematuria, nocturia. Skin: Multiple skin cancers on the arms, legs, face. Hematology: Negative for easy bruising, blood clots. Musculo-skelatal: Negative for back pain, muscle pain, weakness. Neurologic: Negative for headaches, dizziness, vertigo, memory problems not related to HE. Psychology: Negative for anxiety, depression. FAMILY HISTORY:  There is no family history of liver disease  There is no family history of heart disease    SOCIAL HISTORY:  The patient is . The patient has 3 children, 3 stepchildren, and 10 grandchildren, 1 great grandchild. The patient has never used tobacco products. The patient has never consumed significant amounts of alcohol. The patient used to work as in many different jobs. PHYSICAL EXAMINATION:  Visit Vitals  /66 (BP 1 Location: Left arm, BP Patient Position: Sitting)   Pulse 74   Temp (!) 96.6 °F (35.9 °C) (Tympanic)   Ht 5' 3\" (1.6 m)   Wt 156 lb (70.8 kg)   SpO2 96%   BMI 27.63 kg/m²     General: No acute distress. Eyes: Sclera anicteric. ENT: No oral lesions. Thyroid normal.  Nodes: No adenopathy. Skin: Multiple scars from skin cancer surgeries. Respiratory: Lungs clear to auscultation. Cardiovascular: Regular heart rate. Murmur with systolic click related to heart valve replacement/pacemaker.    Abdomen: Soft non-tender. Liver size normal to percussion/palpation. Spleen not palpable. No obvious ascites. Extremities: No edema. No muscle wasting. Myalgias of lower extremities. Neurologic: Alert and oriented. Cranial nerves grossly intact. No asterixis. LABORATORY STUDIES:  Liver [place] of [address] Units 1/10/2020 6/5/2019 2/11/2019   WBC 3.4 - 10.8 x10E3/uL 5.1 4.4 4.6   ANC 1.4 - 7.0 x10E3/uL 3.2     HGB 11.1 - 15.9 g/dL 10.3 (L) 10.6 (L) 10.7 (L)    - 450 x10E3/uL 186 175 158   INR 0.8 - 1.2 2.0 (H) 1.7 (H)    AST 0 - 40 IU/L 27 27 36   ALT 0 - 32 IU/L 20 26 30   Alk Phos 39 - 117 IU/L 74 69 68   Bili, Total 0.0 - 1.2 mg/dL 0.6 0.5 0.5   Bili, Direct 0.00 - 0.40 mg/dL 0.18 0.15 0.17   Albumin 3.5 - 4.8 g/dL 4.7 4.7 4.8   BUN 8 - 27 mg/dL 13 20 14   Creat 0.57 - 1.00 mg/dL 1.07 (H) 0.98 0.97   Na 134 - 144 mmol/L 138 140 137   K 3.5 - 5.2 mmol/L 4.8 4.2 4.4   Cl 96 - 106 mmol/L 98 98 95 (L)   CO2 20 - 29 mmol/L 25 28 26   Glucose 65 - 99 mg/dL 89 103 (H) 94   Magnesium 1.6 - 2.3 mg/dL   1.8       Liver Virology and Transplant Immune Suppression Tacrolimus Level   Latest Ref Rng & Units 2.0 - 20.0 ng/mL   1/10/2020 4.5   6/5/2019 1.4 (L)   2/11/2019 6.4   11/14/2018 2.5       SEROLOGIES:  Not available or performed. Testing was performed today. LIVER HISTOLOGY:  Not available or performed    ENDOSCOPIC PROCEDURES:  Not available or performed    RADIOLOGY:  Not available or performed    OTHER TESTING:  Not available or performed    Return to the liver institute in 6 months. GILDA BeckmanPCNP-[facility]ly [facility] of [address] [address] [address], [address] [facility], [facility] 22.  [facility]

## 2020-06-09 NOTE — PROGRESS NOTES
Stacy Gambino is a 67 y.o. female  Chief Complaint   Patient presents with    Follow-up         Visit Vitals  /66 (BP 1 Location: Left arm, BP Patient Position: Sitting)   Pulse 74   Temp (!) 96.6 °F (35.9 °C) (Tympanic)   Ht 5' 3\" (1.6 m)   Wt 156 lb (70.8 kg)   SpO2 96%   BMI 27.63 kg/m²     3 most recent PHQ Screens 6/9/2020   Little interest or pleasure in doing things Not at all   Feeling down, depressed, irritable, or hopeless Not at all   Total Score PHQ 2 0     Learning Assessment 6/9/2020   PRIMARY LEARNER Patient   BARRIERS PRIMARY LEARNER NONE   CO-LEARNER CAREGIVER No   PRIMARY LANGUAGE ENGLISH   LEARNER PREFERENCE PRIMARY DEMONSTRATION   ANSWERED BY patient   RELATIONSHIP SELF     Abuse Screening Questionnaire 6/9/2020   Do you ever feel afraid of your partner? N   Are you in a relationship with someone who physically or mentally threatens you? N   Is it safe for you to go home? Y           Fall Risk Assessment, last 12 mths 6/9/2020   Able to walk? Yes   Fall in past 12 months? No               1. Have you been to the ER, urgent care clinic since your last visit? Hospitalized since your last visit? no    2. Have you seen or consulted any other health care providers outside of the [address] [place] [address] since your last visit? Include any pap smears or colon screening.  no

## 2020-06-10 DIAGNOSIS — Z79.899 LONG TERM CURRENT USE OF IMMUNOSUPPRESSIVE DRUG: ICD-10-CM

## 2020-06-10 DIAGNOSIS — Z94.4 H/O LIVER TRANSPLANT (HCC): ICD-10-CM

## 2020-06-10 LAB
BASOPHILS # BLD AUTO: 0 X10E3/UL (ref 0–0.2)
BASOPHILS NFR BLD AUTO: 1 %
EOSINOPHIL # BLD AUTO: 0.1 X10E3/UL (ref 0–0.4)
EOSINOPHIL NFR BLD AUTO: 1 %
ERYTHROCYTE [DISTWIDTH] IN BLOOD BY AUTOMATED COUNT: 13.9 % (ref 11.7–15.4)
HCT VFR BLD AUTO: 29.9 % (ref 34–46.6)
HGB BLD-MCNC: 10.2 G/DL (ref 11.1–15.9)
IMM GRANULOCYTES # BLD AUTO: 0 X10E3/UL (ref 0–0.1)
IMM GRANULOCYTES NFR BLD AUTO: 0 %
LYMPHOCYTES # BLD AUTO: 1.4 X10E3/UL (ref 0.7–3.1)
LYMPHOCYTES NFR BLD AUTO: 31 %
MCH RBC QN AUTO: 31.9 PG (ref 26.6–33)
MCHC RBC AUTO-ENTMCNC: 34.1 G/DL (ref 31.5–35.7)
MCV RBC AUTO: 93 FL (ref 79–97)
MONOCYTES # BLD AUTO: 0.4 X10E3/UL (ref 0.1–0.9)
MONOCYTES NFR BLD AUTO: 9 %
NEUTROPHILS # BLD AUTO: 2.6 X10E3/UL (ref 1.4–7)
NEUTROPHILS NFR BLD AUTO: 58 %
PLATELET # BLD AUTO: 173 X10E3/UL (ref 150–450)
RBC # BLD AUTO: 3.2 X10E6/UL (ref 3.77–5.28)
WBC # BLD AUTO: 4.4 X10E3/UL (ref 3.4–10.8)

## 2020-06-11 LAB
ALBUMIN SERPL-MCNC: 4.6 G/DL (ref 3.7–4.7)
ALP SERPL-CCNC: 60 IU/L (ref 39–117)
ALT SERPL-CCNC: 17 IU/L (ref 0–32)
AST SERPL-CCNC: 25 IU/L (ref 0–40)
BILIRUB DIRECT SERPL-MCNC: 0.19 MG/DL (ref 0–0.4)
BILIRUB SERPL-MCNC: 0.6 MG/DL (ref 0–1.2)
BUN SERPL-MCNC: 13 MG/DL (ref 8–27)
BUN/CREAT SERPL: 15 (ref 12–28)
CALCIUM SERPL-MCNC: 9.6 MG/DL (ref 8.7–10.3)
CHLORIDE SERPL-SCNC: 95 MMOL/L (ref 96–106)
CO2 SERPL-SCNC: 26 MMOL/L (ref 20–29)
CREAT SERPL-MCNC: 0.84 MG/DL (ref 0.57–1)
GLUCOSE SERPL-MCNC: 101 MG/DL (ref 65–99)
INR PPP: 1.8 (ref 0.8–1.2)
MAGNESIUM SERPL-MCNC: 1.7 MG/DL (ref 1.6–2.3)
POTASSIUM SERPL-SCNC: 4.6 MMOL/L (ref 3.5–5.2)
PROT SERPL-MCNC: 6.1 G/DL (ref 6–8.5)
PROTHROMBIN TIME: 18.5 SEC (ref 9.1–12)
SODIUM SERPL-SCNC: 136 MMOL/L (ref 134–144)

## 2020-06-12 LAB — TACROLIMUS, 700249: 5.3 NG/ML (ref 2–20)

## 2020-06-15 NOTE — PROGRESS NOTES
Letter sent to the patient regarding the blood work results which are normal. We will see her back in 6 months.

## 2020-12-09 ENCOUNTER — OFFICE VISIT (OUTPATIENT)
Dept: HEMATOLOGY | Age: 80
End: 2020-12-09
Payer: MEDICARE

## 2020-12-09 VITALS
OXYGEN SATURATION: 99 % | RESPIRATION RATE: 16 BRPM | DIASTOLIC BLOOD PRESSURE: 68 MMHG | HEIGHT: 63 IN | TEMPERATURE: 97.9 F | WEIGHT: 146.2 LBS | HEART RATE: 74 BPM | SYSTOLIC BLOOD PRESSURE: 140 MMHG | BODY MASS INDEX: 25.91 KG/M2

## 2020-12-09 DIAGNOSIS — Z94.4 H/O LIVER TRANSPLANT (HCC): Primary | ICD-10-CM

## 2020-12-09 DIAGNOSIS — I50.32 CHRONIC DIASTOLIC CONGESTIVE HEART FAILURE (HCC): ICD-10-CM

## 2020-12-09 DIAGNOSIS — Z79.899 LONG TERM CURRENT USE OF IMMUNOSUPPRESSIVE DRUG: ICD-10-CM

## 2020-12-09 PROCEDURE — 99214 OFFICE O/P EST MOD 30 MIN: CPT | Performed by: NURSE PRACTITIONER

## 2020-12-09 NOTE — PROGRESS NOTES
Identified pt with two pt identifiers(name and ). Reviewed record in preparation for visit and have obtained necessary documentation. Chief Complaint   Patient presents with    Cirrhosis Of Liver     transplant F/U      Vitals:    20 1248   Temp: 97.9 °F (36.6 °C)   TempSrc: Temporal   Weight: 146 lb 3.2 oz (66.3 kg)   Height: 5' 3\" (1.6 m)   PainSc:   0 - No pain       Health Maintenance Review: Patient reminded of \"due or due soon\" health maintenance. I have asked the patient to contact his/her primary care provider (PCP) for follow-up on his/her health maintenance. Coordination of Care Questionnaire:  :   1) Have you been to an emergency room, urgent care, or hospitalized since your last visit? If yes, where when, and reason for visit? Yes, [address] [place] [address] for hip injury from fall over dog    2. Have seen or consulted any other health care provider since your last visit? If yes, where when, and reason for visit? YES, health team for annual appointments      Patient is accompanied by self I have received verbal consent from Dontrell Negrete to discuss any/all medical information while they are present in the room.

## 2020-12-09 NOTE — PROGRESS NOTES
Ibrahima Lopez MD, Katina Lanes, MD Minta Chatters, PA-C Margaret Barefoot, [facility]     April Urban Ramsay, PARDEEP Quiles, PARDEEP Slaughter, PARDEEP Herrera [facility] 136    at [address]    217 [facility], [address] [place] [address], [facility] 22.    [phone]    FAX: [address] [place] [address], [address]    [phone]    FAX: [phone]       Patient Care Team:  Isaac Sahni MD as PCP - General (Family Medicine)  Neena Hazel MD (Family Medicine)  Constantine Romero MD (Cardiology)      Problem List  Date Reviewed: 6/9/2020          Codes Class Noted    Long term current use of immunosuppressive drug ICD-10-CM: Z79.899  ICD-9-CM: V58.69  3/27/2017        Liver transplant complication ([facility] 75.) WUY-71-JS: T86.40  ICD-9-CM: 996.82  8/75/3678        Diastolic congestive heart failure ([facility] ICD-10-CM: I50.30  ICD-9-CM: 428.30, 428.0  9/29/2016        H/O liver transplant [facility]) ICD-10-CM: Z94.4  ICD-9-CM: V42.7  8/17/2016        S/P cataract surgery ICD-10-CM: Z98.49  ICD-9-CM: V45.61  8/17/2016        Osteoporosis ICD-10-CM: M81.0  ICD-9-CM: 733.00  8/17/2016        H/O cervical spine surgery ICD-10-CM: Z98.890  ICD-9-CM: V45.89  8/17/2016        Basal cell carcinoma ICD-10-CM: C44.91  ICD-9-CM: 173.91  8/17/2016    Overview Signed 8/17/2016 11:28 AM by Jl Rodriguez MD     Multiple locations, face, back, neck, legs             S/P aortic valve replacement ICD-10-CM: Z95.2  ICD-9-CM: V43.3  8/17/2016        Pacemaker ICD-10-CM: Z95.0  ICD-9-CM: V45.01  8/17/2016        Essential and other specified forms of tremor ICD-10-CM: G25.0, G25.2  ICD-9-CM: 333.1  8/12/2013        Meningioma ([facility] ICD-10-CM: D32.9  ICD-9-CM: 225.2  8/12/2013        Leg swelling ICD-10-CM: M79.89  ICD-9-CM: 729.81  Unknown        Atrial fib/flutter, transient ICD-9-CM: ZXJ0411  Unknown        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 The active problem list, all pertinent past medical history, medications, radiologic findings and laboratory findings related to the liver disorder were reviewed with the patient. The patient is a 79 y.o.  female who underwent a liver transplant in 2003 at [facility] for cirrhosis and hepatocellular carcinoma. She is unaware of the underlying liver disease that resulted in cirrhosis. She was followed closely at [facility] after the LT for 3 years, then moved to [facility] for 5 years. She has been in [place] since 2011. Since the last office visit the patient has been doing well overall. She notes increasing lower extremity edema over the past 3 weeks. This may be cardiac related due to CHF. She is scheduled to see Cardiology in the near future. The patient has no symptoms which could be attributed to the liver disorder. The patient completes all daily activities without any functional limitations. The patient has not experienced problems concentrating, swelling of the abdomen, swelling of the lower extremities, hematemesis, or hematochezia. All of the issues listed in the Assessment and Plan were discussed with the patient. All questions were answered. The patient expressed a clear understanding of the above. ASSESSMENT AND PLAN:  Liver transplant   This was performed at [facility] in 2003. The liver transaminases are normal.  The ALP is normal.  The liver function is normal.  The platelet count is normal.  No episodes of graft rejection following transplant.   Laboratory studies including a tacrolimus level will be performed every 6 months. Immune Suppression  Tacrolimus is being well tolerated with no side effects. The tacrolimus levels are adequate between 4-5. Will continue the current dose of Tac at 1 mg BID. Imuran is being tolerated well tolerated with no side effects. Will continue at current dose 100 mg QD    Hepatocellular carcinoma  This was present in native liver prior to undergoing liver transplant. There has been no recurrence to date based upon imaging. No further screening for recurrent HCC is needed since she is 15 years out from the LT. Chronic kidney disease  This is common in liver transplant recipients and is a common side effect of immune suppression with calcineurin agents. There is no evidence of CKD at this time. NSAIDs should not be utilized as this may worsen CKD. Hypercholesterolemia   This is a common side effect of immune supression. She does not have hypercholesterolemia    Hypertension  This is a common side effect of immune suppression with calcineurin agents. She does not have hypertension at this time. Treatment of other medical problems in patients with chronic liver disease  There are no contraindications for the patient to take any medications that are necessary for treatment of other medical issues. The patient does not consume alcohol daily. Normal doses of acetaminophen can be used for pain as needed. Counseling for alcohol in patients with chronic liver disease  The patient was counseled regarding alcohol consumption and the effect of alcohol on chronic liver disease. The patient has normal liver function. She can consume an occasional alcoholic beverage,      Multiple skin cancers   The patient has developed multiple skin cancers. Pathology has been basal and squamous. Skin cancers are common after liver transplant due to chronic immujne suppression.   She should continue to be monitored by Dermatology. Colonoscopy  The patient reports doing a Cologuard screening 2-3 years ago. Advised she discuss with the PCP to repeat this. She does not want to have a colonoscopy. Osteoporosis  The risk of osteoporosis is increased in following liver transplantation. DEXA bone density was completed 7/2019 which demonstrated osteopenia. Repeat will be scheduled in 2 years. Hypomagnesia  Low serum magnesium can be caused by immune suppression. Will check serum mag and provide oral supplement if low. Vaccinations   Routine vaccinations against other bacterial and viral agents can be performed as long as live vaccines are not utilizied. Annual flu vaccination should be administered if indicated. Allergies   Allergen Reactions    Codeine Rash    Demerol (Pf) [Meperidine (Pf)] Rash       Current Outpatient Medications   Medication Sig    gabapentin (NEURONTIN) 100 mg capsule One daily for a total of 900mg in the morning and then one po as needed for tremor    gabapentin (NEURONTIN) 400 mg capsule TAKE TWO CAPSULES BY MOUTH THREE TIMES A DAY    azaTHIOprine (IMURAN) 50 mg tablet Take 2 Tabs by mouth daily.  tacrolimus (PROGRAF) 1 mg capsule Take 1 Cap by mouth two (2) times a day.  POTASSIUM CITRATE PO Take 99 mg by mouth.  multivitamin (ONE A DAY) tablet Take 1 Tab by mouth daily.  bumetanide (BUMEX) 1 mg tablet Take  by mouth daily.  potassium chloride SR (KLOR-CON 10) 10 mEq tablet Take 90 mEq by mouth daily.  CALCIUM CARBONATE/MAG CARB/FA (MAGNESIUM-CALCIUM-FOLIC ACID PO) Take  by mouth.  spironolactone (ALDACTONE) 25 mg tablet Take  by mouth daily.  ALIPOIC ACID/BIOFLAV/MV/GR TEA (ULTRA SAMUEL PO) Take  by mouth.  Omeprazole delayed release (PRILOSEC D/R) 20 mg tablet Take 20 mg by mouth two (2) times a day.  WARFARIN SODIUM (WARFARIN PO) Take  by mouth. 1mg/2mg/5mg      No current facility-administered medications for this visit.         SYSTEM REVIEW NOT RELATED TO LIVER DISEASE OR REVIEWED ABOVE:  Constitution systems: Negative for fever, chills, weight gain, weight loss. Eyes: Negative for visual changes. ENT: Negative for sore throat, painful swallowing. Respiratory: Negative for cough, hemoptysis, SOB. Cardiology: Heart valve surgery/repalcement. No SOB. No TAO. GI:  Negative for constipation or diarrhea. : Negative for urinary frequency, dysuria, hematuria, nocturia. Skin: Multiple skin cancers on the arms, legs, face. Hematology: Negative for easy bruising, blood clots. Musculo-skelatal: Negative for back pain, muscle pain, weakness. Neurologic: Negative for headaches, dizziness, vertigo, memory problems not related to HE. Psychology: Negative for anxiety, depression. FAMILY HISTORY:  There is no family history of liver disease  There is no family history of heart disease    SOCIAL HISTORY:  The patient is . The patient has 3 children, 3 stepchildren, and 10 grandchildren, 1 great grandchild. The patient has never used tobacco products. The patient has never consumed significant amounts of alcohol. The patient used to work as in many different jobs. PHYSICAL EXAMINATION:  Visit Vitals  BP (!) 140/68 (BP 1 Location: Right arm, BP Patient Position: Sitting)   Pulse 74   Temp 97.9 °F (36.6 °C) (Temporal)   Resp 16   Ht 5' 3\" (1.6 m)   Wt 146 lb 3.2 oz (66.3 kg)   SpO2 99%   BMI 25.90 kg/m²     General: No acute distress. Eyes: Sclera anicteric. ENT: No oral lesions. Thyroid normal.  Nodes: No adenopathy. Skin: Multiple scars from skin cancer surgeries. Respiratory: Lungs clear to auscultation. Cardiovascular: Regular heart rate. Murmur with systolic click related to heart valve replacement/pacemaker. Abdomen: Soft non-tender. Liver size normal to percussion/palpation. Spleen not palpable. No obvious ascites. Extremities: No edema. No muscle wasting. Myalgias of lower extremities. Neurologic: Alert and oriented. Cranial nerves grossly intact. No asterixis. LABORATORY STUDIES:  Liver [place] of [address] [place] [address] Road & Units 6/10/2020 1/10/2020   WBC 3.4 - 10.8 x10E3/uL 4.4 5.1   ANC 1.4 - 7.0 x10E3/uL 2.6 3.2   HGB 11.1 - 15.9 g/dL 10.2 (L) 10.3 (L)    - 450 x10E3/uL 173 186   INR 0.8 - 1.2 1.8 (H) 2.0 (H)   AST 0 - 40 IU/L 25 27   ALT 0 - 32 IU/L 17 20   Alk Phos 39 - 117 IU/L 60 74   Bili, Total 0.0 - 1.2 mg/dL 0.6 0.6   Bili, Direct 0.00 - 0.40 mg/dL 0.19 0.18   Albumin 3.7 - 4.7 g/dL 4.6 4.7   BUN 8 - 27 mg/dL 13 13   Creat 0.57 - 1.00 mg/dL 0.84 1.07 (H)   Na 134 - 144 mmol/L 136 138   K 3.5 - 5.2 mmol/L 4.6 4.8   Cl 96 - 106 mmol/L 95 (L) 98   CO2 20 - 29 mmol/L 26 25   Glucose 65 - 99 mg/dL 101 (H) 89   Magnesium 1.6 - 2.3 mg/dL 1.7      Liver Virology and Transplant Immune Suppression Tacrolimus Level   Latest Ref Rng & Units 2.0 - 20.0 ng/mL   6/10/2020 5.3   1/10/2020 4.5   6/5/2019 1.4 (L)   2/11/2019 6.4     Repeat testing ordered today. Will follow up when available. SEROLOGIES:  Not available or performed. Testing was performed today. LIVER HISTOLOGY:  Not available or performed    ENDOSCOPIC PROCEDURES:  Not available or performed    RADIOLOGY:  Not available or performed    OTHER TESTING:  Not available or performed    FOLLOW-UP:  All of the issues listed above in the Assessment and Plan were discussed with the patient. All questions were answered. The patient expressed a clear understanding of the above. [address] [facility] 87 in 6 months. April SZUNILDA [facility] of [address] [facility] [address] [facility] [address] [address] [address], [address] [facility] 22.  201 [facility]

## 2020-12-15 ENCOUNTER — TELEPHONE (OUTPATIENT)
Dept: HEMATOLOGY | Age: 80
End: 2020-12-15

## 2020-12-15 LAB
BASOPHILS # BLD AUTO: 0 X10E3/UL (ref 0–0.2)
BASOPHILS NFR BLD AUTO: 0 %
EOSINOPHIL # BLD AUTO: 0 X10E3/UL (ref 0–0.4)
EOSINOPHIL NFR BLD AUTO: 1 %
ERYTHROCYTE [DISTWIDTH] IN BLOOD BY AUTOMATED COUNT: 14.6 % (ref 11.7–15.4)
HCT VFR BLD AUTO: 25.4 % (ref 34–46.6)
HGB BLD-MCNC: 8.7 G/DL (ref 11.1–15.9)
IMM GRANULOCYTES # BLD AUTO: 0 X10E3/UL (ref 0–0.1)
IMM GRANULOCYTES NFR BLD AUTO: 0 %
LYMPHOCYTES # BLD AUTO: 1 X10E3/UL (ref 0.7–3.1)
LYMPHOCYTES NFR BLD AUTO: 28 %
MCH RBC QN AUTO: 33.5 PG (ref 26.6–33)
MCHC RBC AUTO-ENTMCNC: 34.3 G/DL (ref 31.5–35.7)
MCV RBC AUTO: 98 FL (ref 79–97)
MONOCYTES # BLD AUTO: 0.4 X10E3/UL (ref 0.1–0.9)
MONOCYTES NFR BLD AUTO: 11 %
NEUTROPHILS # BLD AUTO: 2.1 X10E3/UL (ref 1.4–7)
NEUTROPHILS NFR BLD AUTO: 60 %
PLATELET # BLD AUTO: 165 X10E3/UL (ref 150–450)
RBC # BLD AUTO: 2.6 X10E6/UL (ref 3.77–5.28)
WBC # BLD AUTO: 3.5 X10E3/UL (ref 3.4–10.8)

## 2020-12-15 NOTE — TELEPHONE ENCOUNTER
Patient said she went to [facility] yesterday, but couldn't get blood work done because they said they didn't have orders. I told patient I'd fax the orders. She said she'll go tomorrow. Faxed lab requisition to [facility] ([facility]) at [phone].

## 2020-12-16 LAB
ALBUMIN SERPL-MCNC: 4.4 G/DL (ref 3.7–4.7)
ALP SERPL-CCNC: 75 IU/L (ref 39–117)
ALT SERPL-CCNC: 20 IU/L (ref 0–32)
AST SERPL-CCNC: 29 IU/L (ref 0–40)
BILIRUB DIRECT SERPL-MCNC: 0.16 MG/DL (ref 0–0.4)
BILIRUB SERPL-MCNC: 0.5 MG/DL (ref 0–1.2)
BUN SERPL-MCNC: 22 MG/DL (ref 8–27)
BUN/CREAT SERPL: 17 (ref 12–28)
CALCIUM SERPL-MCNC: 8.9 MG/DL (ref 8.7–10.3)
CHLORIDE SERPL-SCNC: 100 MMOL/L (ref 96–106)
CO2 SERPL-SCNC: 25 MMOL/L (ref 20–29)
CREAT SERPL-MCNC: 1.32 MG/DL (ref 0.57–1)
GLUCOSE SERPL-MCNC: 95 MG/DL (ref 65–99)
INR PPP: 1.6 (ref 0.9–1.2)
MAGNESIUM SERPL-MCNC: 1.6 MG/DL (ref 1.6–2.3)
POTASSIUM SERPL-SCNC: 4.7 MMOL/L (ref 3.5–5.2)
PROT SERPL-MCNC: 6 G/DL (ref 6–8.5)
PROTHROMBIN TIME: 17.2 SEC (ref 9.1–12)
SODIUM SERPL-SCNC: 138 MMOL/L (ref 134–144)

## 2020-12-17 LAB — TACROLIMUS, 700249: 14.6 NG/ML (ref 2–20)

## 2020-12-21 NOTE — PROGRESS NOTES
Called patient with blood work results, patient states she didn't have her blood work done this day. She had blood work done for cardiology. She plans to repeat blood work tomorrow. Will follow up when results are available.  This appears to be a lab error, she will discuss further with [facility].

## 2020-12-22 LAB
BASOPHILS # BLD AUTO: 0 X10E3/UL (ref 0–0.2)
BASOPHILS NFR BLD AUTO: 1 %
EOSINOPHIL # BLD AUTO: 0 X10E3/UL (ref 0–0.4)
EOSINOPHIL NFR BLD AUTO: 1 %
ERYTHROCYTE [DISTWIDTH] IN BLOOD BY AUTOMATED COUNT: 14.4 % (ref 11.7–15.4)
HCT VFR BLD AUTO: 26.1 % (ref 34–46.6)
HGB BLD-MCNC: 9 G/DL (ref 11.1–15.9)
IMM GRANULOCYTES # BLD AUTO: 0 X10E3/UL (ref 0–0.1)
IMM GRANULOCYTES NFR BLD AUTO: 1 %
LYMPHOCYTES # BLD AUTO: 1.1 X10E3/UL (ref 0.7–3.1)
LYMPHOCYTES NFR BLD AUTO: 26 %
MCH RBC QN AUTO: 33.6 PG (ref 26.6–33)
MCHC RBC AUTO-ENTMCNC: 34.5 G/DL (ref 31.5–35.7)
MCV RBC AUTO: 97 FL (ref 79–97)
MONOCYTES # BLD AUTO: 0.4 X10E3/UL (ref 0.1–0.9)
MONOCYTES NFR BLD AUTO: 9 %
NEUTROPHILS # BLD AUTO: 2.8 X10E3/UL (ref 1.4–7)
NEUTROPHILS NFR BLD AUTO: 62 %
PLATELET # BLD AUTO: 179 X10E3/UL (ref 150–450)
RBC # BLD AUTO: 2.68 X10E6/UL (ref 3.77–5.28)
WBC # BLD AUTO: 4.4 X10E3/UL (ref 3.4–10.8)

## 2020-12-23 LAB
ALBUMIN SERPL-MCNC: 4.5 G/DL (ref 3.7–4.7)
ALP SERPL-CCNC: 102 IU/L (ref 39–117)
ALT SERPL-CCNC: 23 IU/L (ref 0–32)
AST SERPL-CCNC: 31 IU/L (ref 0–40)
BILIRUB DIRECT SERPL-MCNC: 0.19 MG/DL (ref 0–0.4)
BILIRUB SERPL-MCNC: 0.5 MG/DL (ref 0–1.2)
BUN SERPL-MCNC: 17 MG/DL (ref 8–27)
BUN/CREAT SERPL: 14 (ref 12–28)
CALCIUM SERPL-MCNC: 9.3 MG/DL (ref 8.7–10.3)
CHLORIDE SERPL-SCNC: 97 MMOL/L (ref 96–106)
CO2 SERPL-SCNC: 25 MMOL/L (ref 20–29)
CREAT SERPL-MCNC: 1.19 MG/DL (ref 0.57–1)
GLUCOSE SERPL-MCNC: 98 MG/DL (ref 65–99)
INR PPP: 1.6 (ref 0.9–1.2)
MAGNESIUM SERPL-MCNC: 1.4 MG/DL (ref 1.6–2.3)
POTASSIUM SERPL-SCNC: 4.3 MMOL/L (ref 3.5–5.2)
PROT SERPL-MCNC: 6.1 G/DL (ref 6–8.5)
PROTHROMBIN TIME: 16.9 SEC (ref 9.1–12)
SODIUM SERPL-SCNC: 139 MMOL/L (ref 134–144)

## 2020-12-24 LAB — TACROLIMUS, 700249: 10.9 NG/ML (ref 2–20)

## 2020-12-29 ENCOUNTER — PATIENT OUTREACH (OUTPATIENT)
Dept: HEMATOLOGY | Age: 80
End: 2020-12-29

## 2020-12-29 DIAGNOSIS — Z94.4 H/O LIVER TRANSPLANT (HCC): Primary | ICD-10-CM

## 2020-12-29 NOTE — PROGRESS NOTES
Patient is adamant she did not take tacrolimus prior to blood draw and said she has not started any new medications. Patient reports she was hospitalized at [facility] for a couple days with what she thought was a heart attack. She says she was told symptoms could be GI related and she asked if she should follow up with us or someone else. Recommended patient see GI for further evaluation. She says her PCP office offered to refer her and she will ask them to help facilitate.

## 2020-12-31 NOTE — PROGRESS NOTES
Called patient to relay message from April, NP. Left message advising patient to repeat labs early next week. Faxed requisition to [address] [place] [address]) at [phone].

## 2021-01-01 ENCOUNTER — TELEPHONE (OUTPATIENT)
Dept: NEUROLOGY | Age: 81
End: 2021-01-01

## 2021-01-01 ENCOUNTER — OFFICE VISIT (OUTPATIENT)
Dept: HEMATOLOGY | Age: 81
End: 2021-01-01
Payer: MEDICARE

## 2021-01-01 VITALS
HEART RATE: 82 BPM | OXYGEN SATURATION: 99 % | DIASTOLIC BLOOD PRESSURE: 78 MMHG | TEMPERATURE: 96.7 F | SYSTOLIC BLOOD PRESSURE: 130 MMHG | RESPIRATION RATE: 14 BRPM | HEIGHT: 63 IN | BODY MASS INDEX: 26.4 KG/M2 | WEIGHT: 149 LBS

## 2021-01-01 DIAGNOSIS — G52.1 GLOSSOPHARYNGEAL NEURALGIA: ICD-10-CM

## 2021-01-01 DIAGNOSIS — Z94.4 H/O LIVER TRANSPLANT (HCC): Primary | ICD-10-CM

## 2021-01-01 DIAGNOSIS — Z79.899 LONG TERM CURRENT USE OF IMMUNOSUPPRESSIVE DRUG: ICD-10-CM

## 2021-01-01 LAB
ALBUMIN SERPL-MCNC: 4.8 G/DL (ref 3.7–4.7)
ALP SERPL-CCNC: 116 IU/L (ref 44–121)
ALT SERPL-CCNC: 14 IU/L (ref 0–32)
AST SERPL-CCNC: 22 IU/L (ref 0–40)
BASOPHILS # BLD AUTO: 0 X10E3/UL (ref 0–0.2)
BASOPHILS NFR BLD AUTO: 1 %
BILIRUB DIRECT SERPL-MCNC: 0.18 MG/DL (ref 0–0.4)
BILIRUB SERPL-MCNC: 0.7 MG/DL (ref 0–1.2)
BUN SERPL-MCNC: 12 MG/DL (ref 8–27)
BUN/CREAT SERPL: 13 (ref 12–28)
CALCIUM SERPL-MCNC: 9.4 MG/DL (ref 8.7–10.3)
CHLORIDE SERPL-SCNC: 96 MMOL/L (ref 96–106)
CO2 SERPL-SCNC: 29 MMOL/L (ref 20–29)
CREAT SERPL-MCNC: 0.92 MG/DL (ref 0.57–1)
EOSINOPHIL # BLD AUTO: 0 X10E3/UL (ref 0–0.4)
EOSINOPHIL NFR BLD AUTO: 1 %
ERYTHROCYTE [DISTWIDTH] IN BLOOD BY AUTOMATED COUNT: 14 % (ref 11.7–15.4)
GLUCOSE SERPL-MCNC: 112 MG/DL (ref 65–99)
HCT VFR BLD AUTO: 31.2 % (ref 34–46.6)
HGB BLD-MCNC: 10.3 G/DL (ref 11.1–15.9)
IMM GRANULOCYTES # BLD AUTO: 0 X10E3/UL (ref 0–0.1)
IMM GRANULOCYTES NFR BLD AUTO: 0 %
INR PPP: 1.7 (ref 0.9–1.2)
LYMPHOCYTES # BLD AUTO: 1.5 X10E3/UL (ref 0.7–3.1)
LYMPHOCYTES NFR BLD AUTO: 23 %
MAGNESIUM SERPL-MCNC: 1.9 MG/DL (ref 1.6–2.3)
MCH RBC QN AUTO: 31.7 PG (ref 26.6–33)
MCHC RBC AUTO-ENTMCNC: 33 G/DL (ref 31.5–35.7)
MCV RBC AUTO: 96 FL (ref 79–97)
MONOCYTES # BLD AUTO: 0.5 X10E3/UL (ref 0.1–0.9)
MONOCYTES NFR BLD AUTO: 8 %
NEUTROPHILS # BLD AUTO: 4.4 X10E3/UL (ref 1.4–7)
NEUTROPHILS NFR BLD AUTO: 67 %
PLATELET # BLD AUTO: 204 X10E3/UL (ref 150–450)
POTASSIUM SERPL-SCNC: 4.3 MMOL/L (ref 3.5–5.2)
PROT SERPL-MCNC: 6.7 G/DL (ref 6–8.5)
PROTHROMBIN TIME: 17 SEC (ref 9.1–12)
RBC # BLD AUTO: 3.25 X10E6/UL (ref 3.77–5.28)
SODIUM SERPL-SCNC: 139 MMOL/L (ref 134–144)
TACROLIMUS, 700249: 3.6 NG/ML (ref 2–20)
WBC # BLD AUTO: 6.6 X10E3/UL (ref 3.4–10.8)

## 2021-01-01 PROCEDURE — 1101F PT FALLS ASSESS-DOCD LE1/YR: CPT | Performed by: NURSE PRACTITIONER

## 2021-01-01 PROCEDURE — G8419 CALC BMI OUT NRM PARAM NOF/U: HCPCS | Performed by: NURSE PRACTITIONER

## 2021-01-01 PROCEDURE — G8427 DOCREV CUR MEDS BY ELIG CLIN: HCPCS | Performed by: NURSE PRACTITIONER

## 2021-01-01 PROCEDURE — G8536 NO DOC ELDER MAL SCRN: HCPCS | Performed by: NURSE PRACTITIONER

## 2021-01-01 PROCEDURE — 1090F PRES/ABSN URINE INCON ASSESS: CPT | Performed by: NURSE PRACTITIONER

## 2021-01-01 PROCEDURE — 99214 OFFICE O/P EST MOD 30 MIN: CPT | Performed by: NURSE PRACTITIONER

## 2021-01-01 PROCEDURE — G8432 DEP SCR NOT DOC, RNG: HCPCS | Performed by: NURSE PRACTITIONER

## 2021-01-01 RX ORDER — GABAPENTIN 300 MG/1
CAPSULE ORAL
Qty: 810 CAPSULE | Refills: 1 | Status: SHIPPED | OUTPATIENT
Start: 2021-01-01 | End: 2022-01-01

## 2021-01-01 RX ORDER — AZATHIOPRINE 50 MG/1
100 TABLET ORAL DAILY
Qty: 180 TABLET | Refills: 3 | Status: SHIPPED | OUTPATIENT
Start: 2021-01-01 | End: 2022-01-01 | Stop reason: ALTCHOICE

## 2021-01-05 LAB
ALBUMIN SERPL-MCNC: 4.5 G/DL (ref 3.7–4.7)
ALP SERPL-CCNC: 93 IU/L (ref 39–117)
ALT SERPL-CCNC: 19 IU/L (ref 0–32)
AST SERPL-CCNC: 25 IU/L (ref 0–40)
BILIRUB DIRECT SERPL-MCNC: 0.23 MG/DL (ref 0–0.4)
BILIRUB SERPL-MCNC: 0.7 MG/DL (ref 0–1.2)
BUN SERPL-MCNC: 21 MG/DL (ref 8–27)
BUN/CREAT SERPL: 16 (ref 12–28)
CALCIUM SERPL-MCNC: 9.2 MG/DL (ref 8.7–10.3)
CHLORIDE SERPL-SCNC: 100 MMOL/L (ref 96–106)
CO2 SERPL-SCNC: 21 MMOL/L (ref 20–29)
CREAT SERPL-MCNC: 1.33 MG/DL (ref 0.57–1)
GLUCOSE SERPL-MCNC: 82 MG/DL (ref 65–99)
POTASSIUM SERPL-SCNC: 4.6 MMOL/L (ref 3.5–5.2)
PROT SERPL-MCNC: 6 G/DL (ref 6–8.5)
SODIUM SERPL-SCNC: 137 MMOL/L (ref 134–144)

## 2021-01-06 LAB — TACROLIMUS, 700249: 10.6 NG/ML (ref 2–20)

## 2021-01-08 ENCOUNTER — PATIENT OUTREACH (OUTPATIENT)
Dept: HEMATOLOGY | Age: 81
End: 2021-01-08

## 2021-01-08 DIAGNOSIS — Z94.4 H/O LIVER TRANSPLANT (HCC): Primary | ICD-10-CM

## 2021-01-08 NOTE — PROGRESS NOTES
Labs were reviewed by Dr. Paras Mendez, and myself. Called patient and verified the correct medication and dose was dispensed via imprint on pill. Advised patient to decrease tacrolimus to 1 mg daily. Patient to repeat labs on 1/18/21. Faxed lab requisition to [facility]) at [phone].

## 2021-01-11 NOTE — PROGRESS NOTES
Called patient to discuss FK level which has been persistently high. Will decrease dose to 1 mg daily and repeat in 2 weeks.

## 2021-01-19 LAB
ALBUMIN SERPL-MCNC: 4.6 G/DL (ref 3.7–4.7)
ALP SERPL-CCNC: 86 IU/L (ref 39–117)
ALT SERPL-CCNC: 17 IU/L (ref 0–32)
AST SERPL-CCNC: 23 IU/L (ref 0–40)
BILIRUB DIRECT SERPL-MCNC: 0.17 MG/DL (ref 0–0.4)
BILIRUB SERPL-MCNC: 0.6 MG/DL (ref 0–1.2)
BUN SERPL-MCNC: 20 MG/DL (ref 8–27)
BUN/CREAT SERPL: 16 (ref 12–28)
CALCIUM SERPL-MCNC: 9.4 MG/DL (ref 8.7–10.3)
CHLORIDE SERPL-SCNC: 98 MMOL/L (ref 96–106)
CO2 SERPL-SCNC: 26 MMOL/L (ref 20–29)
CREAT SERPL-MCNC: 1.28 MG/DL (ref 0.57–1)
GLUCOSE SERPL-MCNC: 92 MG/DL (ref 65–99)
POTASSIUM SERPL-SCNC: 4.6 MMOL/L (ref 3.5–5.2)
PROT SERPL-MCNC: 6.4 G/DL (ref 6–8.5)
SODIUM SERPL-SCNC: 136 MMOL/L (ref 134–144)

## 2021-01-20 LAB — TACROLIMUS, 700249: 6.1 NG/ML (ref 2–20)

## 2021-01-26 ENCOUNTER — PATIENT OUTREACH (OUTPATIENT)
Dept: HEMATOLOGY | Age: 81
End: 2021-01-26

## 2021-01-26 DIAGNOSIS — Z94.4 H/O LIVER TRANSPLANT (HCC): Primary | ICD-10-CM

## 2021-01-26 NOTE — PROGRESS NOTES
Message received from April, NP:   Please let her know tacrolimus level has decreased. We can repeat again in 2-4 weeks. Not sure why it is so high only on 1 mg but we will continue to monitor.

## 2021-01-26 NOTE — PROGRESS NOTES
Called patient and relayed message from NP. Patient to repeat labs on 2/10/21. Mailed lab requisition to patient.

## 2021-01-28 RX ORDER — AZATHIOPRINE 50 MG/1
TABLET ORAL
Qty: 180 TAB | Refills: 2 | Status: SHIPPED | OUTPATIENT
Start: 2021-01-28 | End: 2021-01-01 | Stop reason: SDUPTHER

## 2021-02-06 RX ORDER — TACROLIMUS 1 MG/1
1 CAPSULE ORAL DAILY
Qty: 90 CAP | Refills: 3 | Status: SHIPPED | OUTPATIENT
Start: 2021-02-06 | End: 2021-06-08

## 2021-02-11 ENCOUNTER — TELEPHONE (OUTPATIENT)
Dept: HEMATOLOGY | Age: 81
End: 2021-02-11

## 2021-02-11 NOTE — TELEPHONE ENCOUNTER
Patient left message yesterday saying she went to [facility] to get labs drawn, but they didn't have orders. I was out of the office, so I didn't get the message until this morning. Faxed requisition to [address] [place] [address] ([facility]) at [phone]. Return call placed to patient. She said she didn't get the requisition that was mailed to her. Advised her orders had been faxed to [address] [place] [address]. She will either get them done today, or wait until Monday due to forecasted ice and snow later today.

## 2021-02-17 LAB
ALBUMIN SERPL-MCNC: 4.4 G/DL (ref 3.7–4.7)
ALP SERPL-CCNC: 84 IU/L (ref 39–117)
ALT SERPL-CCNC: 13 IU/L (ref 0–32)
AST SERPL-CCNC: 19 IU/L (ref 0–40)
BILIRUB DIRECT SERPL-MCNC: 0.17 MG/DL (ref 0–0.4)
BILIRUB SERPL-MCNC: 0.5 MG/DL (ref 0–1.2)
BUN SERPL-MCNC: 19 MG/DL (ref 8–27)
BUN/CREAT SERPL: 15 (ref 12–28)
CALCIUM SERPL-MCNC: 9.5 MG/DL (ref 8.7–10.3)
CHLORIDE SERPL-SCNC: 98 MMOL/L (ref 96–106)
CO2 SERPL-SCNC: 27 MMOL/L (ref 20–29)
CREAT SERPL-MCNC: 1.31 MG/DL (ref 0.57–1)
GLUCOSE SERPL-MCNC: 91 MG/DL (ref 65–99)
POTASSIUM SERPL-SCNC: 4.7 MMOL/L (ref 3.5–5.2)
PROT SERPL-MCNC: 6.1 G/DL (ref 6–8.5)
SODIUM SERPL-SCNC: 139 MMOL/L (ref 134–144)

## 2021-02-18 LAB — TACROLIMUS, 700249: 1.2 NG/ML (ref 2–20)

## 2021-02-26 ENCOUNTER — PATIENT OUTREACH (OUTPATIENT)
Dept: HEMATOLOGY | Age: 81
End: 2021-02-26

## 2021-02-26 DIAGNOSIS — Z94.4 H/O LIVER TRANSPLANT (HCC): Primary | ICD-10-CM

## 2021-02-26 NOTE — PROGRESS NOTES
Called patient and reviewed lab results. Advised her to continue tacrolimus 1 mg daily and repeat labs prior to follow up appointment on 3/10/2021.    Faxed lab requisition to [employer] ([facility]) at [phone].

## 2021-03-06 LAB
ALBUMIN SERPL-MCNC: 4.8 G/DL (ref 3.7–4.7)
ALP SERPL-CCNC: 96 IU/L (ref 39–117)
ALT SERPL-CCNC: 10 IU/L (ref 0–32)
AST SERPL-CCNC: 23 IU/L (ref 0–40)
BILIRUB DIRECT SERPL-MCNC: 0.16 MG/DL (ref 0–0.4)
BILIRUB SERPL-MCNC: 0.4 MG/DL (ref 0–1.2)
BUN SERPL-MCNC: 14 MG/DL (ref 8–27)
BUN/CREAT SERPL: 15 (ref 12–28)
CALCIUM SERPL-MCNC: 9.1 MG/DL (ref 8.7–10.3)
CHLORIDE SERPL-SCNC: 97 MMOL/L (ref 96–106)
CO2 SERPL-SCNC: 26 MMOL/L (ref 20–29)
CREAT SERPL-MCNC: 0.96 MG/DL (ref 0.57–1)
GLUCOSE SERPL-MCNC: 92 MG/DL (ref 65–99)
POTASSIUM SERPL-SCNC: 4.3 MMOL/L (ref 3.5–5.2)
PROT SERPL-MCNC: 6.5 G/DL (ref 6–8.5)
SODIUM SERPL-SCNC: 137 MMOL/L (ref 134–144)

## 2021-03-08 LAB — TACROLIMUS, 700249: 2.7 NG/ML (ref 2–20)

## 2021-03-10 ENCOUNTER — OFFICE VISIT (OUTPATIENT)
Dept: HEMATOLOGY | Age: 81
End: 2021-03-10
Payer: MEDICARE

## 2021-03-10 VITALS
TEMPERATURE: 97.4 F | HEART RATE: 74 BPM | OXYGEN SATURATION: 100 % | SYSTOLIC BLOOD PRESSURE: 135 MMHG | WEIGHT: 140 LBS | DIASTOLIC BLOOD PRESSURE: 64 MMHG | HEIGHT: 63 IN | RESPIRATION RATE: 16 BRPM | BODY MASS INDEX: 24.8 KG/M2

## 2021-03-10 DIAGNOSIS — D64.9 ANEMIA, UNSPECIFIED TYPE: ICD-10-CM

## 2021-03-10 DIAGNOSIS — Z79.899 LONG TERM CURRENT USE OF IMMUNOSUPPRESSIVE DRUG: ICD-10-CM

## 2021-03-10 DIAGNOSIS — Z94.4 H/O LIVER TRANSPLANT (HCC): Primary | ICD-10-CM

## 2021-03-10 PROCEDURE — G8432 DEP SCR NOT DOC, RNG: HCPCS | Performed by: NURSE PRACTITIONER

## 2021-03-10 PROCEDURE — G8427 DOCREV CUR MEDS BY ELIG CLIN: HCPCS | Performed by: NURSE PRACTITIONER

## 2021-03-10 PROCEDURE — 1101F PT FALLS ASSESS-DOCD LE1/YR: CPT | Performed by: NURSE PRACTITIONER

## 2021-03-10 PROCEDURE — 99214 OFFICE O/P EST MOD 30 MIN: CPT | Performed by: NURSE PRACTITIONER

## 2021-03-10 PROCEDURE — G8420 CALC BMI NORM PARAMETERS: HCPCS | Performed by: NURSE PRACTITIONER

## 2021-03-10 PROCEDURE — 1090F PRES/ABSN URINE INCON ASSESS: CPT | Performed by: NURSE PRACTITIONER

## 2021-03-10 PROCEDURE — G8536 NO DOC ELDER MAL SCRN: HCPCS | Performed by: NURSE PRACTITIONER

## 2021-03-10 NOTE — PROGRESS NOTES
Identified pt with two pt identifiers(name and ). Reviewed record in preparation for visit and have obtained necessary documentation. Chief Complaint   Patient presents with    Other     transplant f/u      Vitals:    03/10/21 1311   Temp: 97.4 °F (36.3 °C)   TempSrc: Temporal   Height: 5' 3\" (1.6 m)   PainSc:   0 - No pain       Health Maintenance Review: Patient reminded of \"due or due soon\" health maintenance. I have asked the patient to contact his/her primary care provider (PCP) for follow-up on his/her health maintenance. Coordination of Care Questionnaire:  :   1) Have you been to an emergency room, urgent care, or hospitalized since your last visit? If yes, where when, and reason for visit? Yes, 2021. SOB, Chest tightening    2. Have seen or consulted any other health care provider since your last visit? If yes, where when, and reason for visit? Yes, an internist ayah/ Cherelle Soares and Cardiologist, Dr Anel Braswell for f/u appointments. Pulmonologist for ankle swelling, leaking veins in both legs       Patient is accompanied by self I have received verbal consent from Lissa Palafox to discuss any/all medical information while they are present in the room.

## 2021-03-10 NOTE — PROGRESS NOTES
[address] [place] [address], MD, MD Jorge Rhodes PA-C Ismael Carolus, [facility]     April Prashant Escobar, PARDEEP Duron NP [address] [facility] [address]    at [facility]    [address] [facility], [facility], [facility].    [phone]    FAX: [address] [place] [address], [address], [address]    [phone]    FAX: [phone]       Patient Care Team:  Cal Pickens MD as PCP - General (Family Medicine)  Fernandez Roberts MD (Family Medicine)  Kierra Rodriguez MD (Cardiology)      Problem List  Date Reviewed: 12/9/2020          Codes Class Noted    Iron deficiency anemia secondary to inadequate dietary iron intake ICD-10-CM: D50.8  ICD-9-CM: 280.1  3/12/2021        Long term current use of immunosuppressive drug ICD-10-CM: Z79.899  ICD-9-CM: V58.69  3/27/2017        Liver transplant complication ([facility] [id number]: T86.40  ICD-9-CM: 996.82  2/96/1183        Diastolic congestive [facility] ICD-10-CM: I50.30  ICD-9-CM: 428.30, 428.0  9/29/2016        H/O liver transplant [facility]) ICD-10-CM: Z94.4  ICD-9-CM: V42.7  8/17/2016        S/P cataract surgery ICD-10-CM: Z98.49  ICD-9-CM: V45.61  8/17/2016        Osteoporosis ICD-10-CM: M81.0  ICD-9-CM: 733.00  8/17/2016        H/O cervical spine surgery ICD-10-CM: Z98.890  ICD-9-CM: V45.89  8/17/2016        Basal cell carcinoma ICD-10-CM: C44.91  ICD-9-CM: 173.91  8/17/2016    Overview Signed 8/17/2016 11:28 AM by Deonna Tillman MD     Multiple locations, face, back, neck, legs             S/P aortic valve replacement ICD-10-CM: Z95.2  ICD-9-CM: V43.3  8/17/2016        Pacemaker ICD-10-CM: Z95.0  ICD-9-CM: V45.01  8/17/2016        Essential and other specified forms of tremor ICD-10-CM: G25.0, G25.2  ICD-9-CM: 333.1  8/12/2013        Meningioma ([address] ICD-10-CM: D32.9  ICD-9-CM: 225.2  8/12/2013        Leg swelling ICD-10-CM: M79.89  ICD-9-CM: 729.81  Unknown        Atrial fib/flutter, transient ICD-9-CM: EAP6282  Unknown        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 The active problem list, all pertinent past medical history, medications, radiologic findings and laboratory findings related to the liver disorder were reviewed with the patient. The patient is a 79 y.o.  female who underwent a liver transplant in 2003 at [facility] for cirrhosis and hepatocellular carcinoma. She is unaware of the underlying liver disease that resulted in cirrhosis. She was followed closely at [facility] after the LT for 3 years, then moved to [facility] for 5 years. She has been in [place] since 2011. Since the last office visit the patient had an episode of acute chest pain and was evaluated in the ER at [facility]. Documentation was not available at the time of the office visit. The patient reports no acute abnormalities found. The tacrolimus level had a sudden elevation at this time to 14. 6. No changes in medication or new medication were added that may have caused the increase. The testing was repeated to determine accuracy, the level was 10.9. The dose of tacrolimus was decreased to 1 mg daily on 1/08/2021. The testing was repeated on 3/05/2021 and the level is now 2.7. Of note, she is scheduled to receive the first Moderna vaccine on Saturday. The patient reports feeling better than she had in several years. There are no symptoms of liver disease. She completes all daily activities without any functional limitations.      She has not experienced swelling of the abdomen, problems with concentration or itching. ASSESSMENT AND PLAN:  Liver transplant   This was performed at [facility] in 2003. Laboratory testing from 3/05/2021 reviewed in detail. The liver transaminases, ALP, liver function and platelet count are normal.   There have been no episodes of graft rejection following transplant. Immune Suppression  Tacrolimus level increased to 14.6. The dose was decreased to 1 mg daily. Laboratory testing from 3/05/2021 demonstrated a therapeutic level at 2.7. Will continue tacrolimus at this dose and repeat laboratory testing in 1 week. Immuran is currently at 100 mg twice daily. Will continue at this dose. Anemia  The HGB has been ranging 9-10 g/dl. This is most likely iron deficiency anemia. Will repeat the CBC, iron studies, B12/Folate with the next laboratory testing. She defers having a colonoscopy. PCP should order cologuard. Hepatocellular carcinoma  Incidental finding in the native liver at the time of transplant. Follow up screening is discontinued after 5 years post-transplant. No further testing required. Chronic kidney disease  Sr. Creatinine is normal.   NSAIDs should not be utilized as this may worsen CKD. Hypercholesterolemia   This is a common side effect of immune supression. She does not have hypercholesterolemia    Hypertension  This is a common side effect of immune suppression with calcineurin agents. She does not have hypertension at this time. Treatment of other medical problems in patients with chronic liver disease  There are no contraindications for the patient to take any medications that are necessary for treatment of other medical issues. The patient does not consume alcohol daily. Normal doses of acetaminophen can be used for pain as needed.       Counseling for alcohol in patients with chronic liver disease  The patient was counseled regarding alcohol consumption and the effect of alcohol on chronic liver disease. The patient has normal liver function. She can consume an occasional alcoholic beverage. Multiple skin cancers   The patient has developed multiple skin cancers. Pathology has been basal and squamous. Skin cancers are common after liver transplant due to chronic immujne suppression. She should continue to be monitored by Dermatology. Colonoscopy  The patient reports doing a Cologuard screening 2-3 years ago. She defers colonoscopy. Cologuard should be ordered by the PCP. Osteoporosis  The risk of osteoporosis is increased in following liver transplantation. DEXA bone density was completed 7/2019 which demonstrated osteopenia. Repeat testing will be due this year. Hypomagnesia  Low serum magnesium can be caused by immune suppression. Will check serum mag and provide oral supplement if low. Vaccinations   Routine vaccinations against other bacterial and viral agents can be performed as long as live vaccines are not utilizied. Annual flu vaccination should be administered if indicated. Allergies   Allergen Reactions    Codeine Rash    Demerol (Pf) [Meperidine (Pf)] Rash       Current Outpatient Medications   Medication Sig    gabapentin (NEURONTIN) 400 mg capsule TAKE 2 CAPSULES 3 TIMES A  DAY    tacrolimus (PROGRAF) 1 mg capsule Take 1 Cap by mouth daily.  azaTHIOprine (IMURAN) 50 mg tablet TAKE 2 TABLETS BY MOUTH ONCE DAILY    gabapentin (NEURONTIN) 100 mg capsule One daily for a total of 900mg in the morning and then one po as needed for tremor    POTASSIUM CITRATE PO Take 99 mg by mouth.  multivitamin (ONE A DAY) tablet Take 1 Tab by mouth daily.  bumetanide (BUMEX) 1 mg tablet Take  by mouth daily.  potassium chloride SR (KLOR-CON 10) 10 mEq tablet Take 90 mEq by mouth daily.  CALCIUM CARBONATE/MAG CARB/FA (MAGNESIUM-CALCIUM-FOLIC ACID PO) Take  by mouth.  spironolactone (ALDACTONE) 25 mg tablet Take  by mouth daily.    Jessica Urena ACID/BIOFLAV/MV/GR TEA (ULTRA SAMUEL PO) Take  by mouth.  Omeprazole delayed release (PRILOSEC D/R) 20 mg tablet Take 20 mg by mouth two (2) times a day.  WARFARIN SODIUM (WARFARIN PO) Take  by mouth. 1mg/2mg/5mg      No current facility-administered medications for this visit. SYSTEM REVIEW NOT RELATED TO LIVER DISEASE OR REVIEWED ABOVE:  Constitution systems: Negative for fever, chills, weight gain, weight loss. Eyes: Negative for visual changes. ENT: Negative for sore throat, painful swallowing. Respiratory: Negative for cough, hemoptysis, SOB. Cardiology: Negative for chest pain, palpitations. H/O heart valve surgery. GI:  Negative for constipation or diarrhea. : Negative for urinary frequency, dysuria, hematuria, nocturia. Skin: Negative for rash. H/O skin cancer  Hematology: Negative for easy bruising, blood clots. Musculo-skelatal: Negative for back pain, muscle pain, weakness. Neurologic: Negative for headaches, dizziness, vertigo, memory problems not related to HE. Psychology: Negative for anxiety, depression. FAMILY HISTORY:  There is no family history of liver disease  There is no family history of heart disease    SOCIAL HISTORY:  The patient is . The patient has 3 children, 3 stepchildren, and 10 grandchildren, 1 great grandchild. The patient has never used tobacco products. The patient has never consumed significant amounts of alcohol. The patient used to work as in many different jobs. PHYSICAL EXAMINATION:  Visit Vitals  /64 (BP 1 Location: Right upper arm, BP Patient Position: Sitting, BP Cuff Size: Adult)   Pulse 74   Temp 97.4 °F (36.3 °C) (Temporal)   Resp 16   Ht 5' 3\" (1.6 m)   Wt 140 lb (63.5 kg)   SpO2 100%   BMI 24.80 kg/m²       General: No acute distress. Eyes: Sclera anicteric. ENT: No oral lesions. Thyroid normal.  Nodes: No adenopathy. Skin: No spider angiomata. No jaundice. No palmar erythema.   Respiratory: Lungs clear to auscultation. Cardiovascular: Regular heart rate. No JVD. Systolic murmur. Abdomen: Soft non-tender, liver size normal to percussion/palpation. Spleen not palpable. No obvious ascites. Extremities: No edema. No muscle wasting. No gross arthritic changes. Neurologic: Alert and oriented. Cranial nerves grossly intact. No asterixis. LABORATORY STUDIES:  Liver [place] of [address] Units 3/5/2021 2/16/2021 1/18/2021   WBC 3.4 - 10.8 x10E3/uL      ANC 1.4 - 7.0 x10E3/uL      HGB 11.1 - 15.9 g/dL       - 450 x10E3/uL      INR 0.9 - 1.2      AST 0 - 40 IU/L 23 19 23   ALT 0 - 32 IU/L 10 13 17   Alk Phos 39 - 117 IU/L 96 84 86   Bili, Total 0.0 - 1.2 mg/dL 0.4 0.5 0.6   Bili, Direct 0.00 - 0.40 mg/dL 0.16 0.17 0.17   Albumin 3.7 - 4.7 g/dL 4.8 (H) 4.4 4.6   BUN 8 - 27 mg/dL 14 19 20   Creat 0.57 - 1.00 mg/dL 0.96 1.31 (H) 1.28 (H)   Na 134 - 144 mmol/L 137 139 136   K 3.5 - 5.2 mmol/L 4.3 4.7 4.6   Cl 96 - 106 mmol/L 97 98 98   CO2 20 - 29 mmol/L 26 27 26   Glucose 65 - 99 mg/dL 92 91 92     Liver Virology and Transplant Immune Suppression Tacrolimus Level   Latest Ref Rng & Units 2.0 - 20.0 ng/mL   3/5/2021 2.7   2/16/2021 1.2 (L)   1/18/2021 6.1   1/4/2021 10.6   12/22/2020 10.9   12/15/2020 14.6     Laboratory testing from 3/05/2021 reviewed in detail. Will continue tacrolimus at 1 mg daily. Repeat testing will be ordered. Additional testing included to assess liver disease progression or regression. SEROLOGIES:  Not available or performed. Testing was performed today. LIVER HISTOLOGY:  Not available or performed    ENDOSCOPIC PROCEDURES:  Not available or performed    RADIOLOGY:  Not available or performed    OTHER TESTING:  Not available or performed    FOLLOW-UP:  All of the issues listed above in the Assessment and Plan were discussed with the patient. All questions were answered. The patient expressed a clear understanding of the above.     My previous note dated 12/09/2020 was reviewed in detail and appropriate changes were made to update information. All other information will remain for continuation of care. 1901 Merged with [facility] in 3 months. April ZUNILDA Orozco-[facility] of [address] [phone] [address] [place], 2000 [facility] 22.  [facility]

## 2021-03-12 PROBLEM — D50.8 IRON DEFICIENCY ANEMIA SECONDARY TO INADEQUATE DIETARY IRON INTAKE: Status: ACTIVE | Noted: 2021-03-12

## 2021-03-30 LAB
ALBUMIN SERPL-MCNC: 4.6 G/DL (ref 3.7–4.7)
ALP SERPL-CCNC: 97 IU/L (ref 39–117)
ALT SERPL-CCNC: 12 IU/L (ref 0–32)
AST SERPL-CCNC: 25 IU/L (ref 0–40)
BASOPHILS # BLD AUTO: 0 X10E3/UL (ref 0–0.2)
BASOPHILS NFR BLD AUTO: 1 %
BILIRUB DIRECT SERPL-MCNC: 0.21 MG/DL (ref 0–0.4)
BILIRUB SERPL-MCNC: 0.6 MG/DL (ref 0–1.2)
BUN SERPL-MCNC: 15 MG/DL (ref 8–27)
BUN/CREAT SERPL: 15 (ref 12–28)
CALCIUM SERPL-MCNC: 9.2 MG/DL (ref 8.7–10.3)
CHLORIDE SERPL-SCNC: 93 MMOL/L (ref 96–106)
CO2 SERPL-SCNC: 25 MMOL/L (ref 20–29)
CREAT SERPL-MCNC: 1 MG/DL (ref 0.57–1)
EOSINOPHIL # BLD AUTO: 0 X10E3/UL (ref 0–0.4)
EOSINOPHIL NFR BLD AUTO: 0 %
ERYTHROCYTE [DISTWIDTH] IN BLOOD BY AUTOMATED COUNT: 13.7 % (ref 11.7–15.4)
FERRITIN SERPL-MCNC: 69 NG/ML (ref 15–150)
FOLATE SERPL-MCNC: 19.9 NG/ML
GLUCOSE SERPL-MCNC: 81 MG/DL (ref 65–99)
HCT VFR BLD AUTO: 26.3 % (ref 34–46.6)
HGB BLD-MCNC: 8.9 G/DL (ref 11.1–15.9)
IMM GRANULOCYTES # BLD AUTO: 0 X10E3/UL (ref 0–0.1)
IMM GRANULOCYTES NFR BLD AUTO: 0 %
INR PPP: 1.4 (ref 0.9–1.2)
IRON SATN MFR SERPL: 18 % (ref 15–55)
IRON SERPL-MCNC: 59 UG/DL (ref 27–139)
LYMPHOCYTES # BLD AUTO: 1.1 X10E3/UL (ref 0.7–3.1)
LYMPHOCYTES NFR BLD AUTO: 19 %
MAGNESIUM SERPL-MCNC: 1.7 MG/DL (ref 1.6–2.3)
MCH RBC QN AUTO: 32.6 PG (ref 26.6–33)
MCHC RBC AUTO-ENTMCNC: 33.8 G/DL (ref 31.5–35.7)
MCV RBC AUTO: 96 FL (ref 79–97)
MONOCYTES # BLD AUTO: 0.5 X10E3/UL (ref 0.1–0.9)
MONOCYTES NFR BLD AUTO: 8 %
NEUTROPHILS # BLD AUTO: 3.9 X10E3/UL (ref 1.4–7)
NEUTROPHILS NFR BLD AUTO: 72 %
PLATELET # BLD AUTO: 193 X10E3/UL (ref 150–450)
POTASSIUM SERPL-SCNC: 4.9 MMOL/L (ref 3.5–5.2)
PROT SERPL-MCNC: 6.3 G/DL (ref 6–8.5)
PROTHROMBIN TIME: 14.5 SEC (ref 9.1–12)
RBC # BLD AUTO: 2.73 X10E6/UL (ref 3.77–5.28)
SODIUM SERPL-SCNC: 133 MMOL/L (ref 134–144)
TIBC SERPL-MCNC: 337 UG/DL (ref 250–450)
UIBC SERPL-MCNC: 278 UG/DL (ref 118–369)
VIT B12 SERPL-MCNC: 398 PG/ML (ref 232–1245)
WBC # BLD AUTO: 5.5 X10E3/UL (ref 3.4–10.8)

## 2021-03-31 LAB — TACROLIMUS, 700249: 7 NG/ML (ref 2–20)

## 2021-04-23 DIAGNOSIS — G52.1 GLOSSOPHARYNGEAL NEURALGIA: ICD-10-CM

## 2021-04-23 NOTE — TELEPHONE ENCOUNTER
----- Message from Melchor Hunter sent at 4/23/2021 10:27 AM EDT -----  Regarding: NP Brandt/ Refill  Medication Refill    Caller (if not patient): n/a      Relationship of caller (if not patient): n/a      Best contact number(s): [phone]      Name of medication and dosage if known: Gabapentin      Is patient out of this medication (yes/no):  No      Pharmacy name: [address] listed in chart? (yes/no): Yes  Pharmacy phone number:      Details to clarify the request:      Melchor Hunter

## 2021-04-26 RX ORDER — GABAPENTIN 100 MG/1
CAPSULE ORAL
Qty: 180 CAP | Refills: 0 | Status: SHIPPED | OUTPATIENT
Start: 2021-04-26 | End: 2021-05-12 | Stop reason: DRUGHIGH

## 2021-04-26 RX ORDER — GABAPENTIN 400 MG/1
CAPSULE ORAL
Qty: 540 CAP | Refills: 0 | Status: SHIPPED | OUTPATIENT
Start: 2021-04-26 | End: 2021-05-12 | Stop reason: DRUGHIGH

## 2021-05-11 ENCOUNTER — HOSPITAL ENCOUNTER (OUTPATIENT)
Dept: GENERAL RADIOLOGY | Age: 81
Discharge: HOME | End: 2021-05-11
Attending: DERMATOLOGY
Payer: MEDICARE

## 2021-05-11 ENCOUNTER — TRANSCRIBE ORDER (OUTPATIENT)
Dept: GENERAL RADIOLOGY | Age: 81
End: 2021-05-11

## 2021-05-11 DIAGNOSIS — C44.42 SQUAMOUS CELL CARCINOMA OF SCALP: ICD-10-CM

## 2021-05-11 DIAGNOSIS — C44.42 SQUAMOUS CELL CARCINOMA OF SCALP: Primary | ICD-10-CM

## 2021-05-11 PROCEDURE — 70250 X-RAY EXAM OF SKULL: CPT

## 2021-05-12 ENCOUNTER — OFFICE VISIT (OUTPATIENT)
Dept: NEUROLOGY | Age: 81
End: 2021-05-12
Payer: MEDICARE

## 2021-05-12 VITALS
WEIGHT: 139 LBS | HEIGHT: 63 IN | HEART RATE: 73 BPM | OXYGEN SATURATION: 99 % | BODY MASS INDEX: 24.63 KG/M2 | TEMPERATURE: 98.3 F | RESPIRATION RATE: 14 BRPM | SYSTOLIC BLOOD PRESSURE: 138 MMHG | DIASTOLIC BLOOD PRESSURE: 78 MMHG

## 2021-05-12 DIAGNOSIS — G25.0 ESSENTIAL TREMOR: Primary | ICD-10-CM

## 2021-05-12 DIAGNOSIS — G52.1 GLOSSOPHARYNGEAL NEURALGIA: ICD-10-CM

## 2021-05-12 PROCEDURE — 1101F PT FALLS ASSESS-DOCD LE1/YR: CPT | Performed by: NURSE PRACTITIONER

## 2021-05-12 PROCEDURE — G8432 DEP SCR NOT DOC, RNG: HCPCS | Performed by: NURSE PRACTITIONER

## 2021-05-12 PROCEDURE — G8427 DOCREV CUR MEDS BY ELIG CLIN: HCPCS | Performed by: NURSE PRACTITIONER

## 2021-05-12 PROCEDURE — G8536 NO DOC ELDER MAL SCRN: HCPCS | Performed by: NURSE PRACTITIONER

## 2021-05-12 PROCEDURE — G8420 CALC BMI NORM PARAMETERS: HCPCS | Performed by: NURSE PRACTITIONER

## 2021-05-12 PROCEDURE — 99214 OFFICE O/P EST MOD 30 MIN: CPT | Performed by: NURSE PRACTITIONER

## 2021-05-12 PROCEDURE — 1090F PRES/ABSN URINE INCON ASSESS: CPT | Performed by: NURSE PRACTITIONER

## 2021-05-12 RX ORDER — GABAPENTIN 300 MG/1
900 CAPSULE ORAL 3 TIMES DAILY
COMMUNITY
End: 2021-06-03 | Stop reason: SDUPTHER

## 2021-05-12 NOTE — PROGRESS NOTES
[address] [facility]  [address]   [place] [address] [place] [facility] [place] [address] [address]   [phone] Office   [phone] Fax                 Date:  21     Name:  Papi Hennessy  :  1940  MRN:  [id number]     PCP:  Mikel Barrera MD    Chief Complaint   Patient presents with    Follow-up     take gabapentin for nerve in the face and for tremors. needs refill on gabapentin       HISTORY OF PRESENT ILLNESS: Follow up for glossopharyngeal neuralgia and essential tremor. She does not notice the neuralgia as long as she takes the gabapentin. The tremor has been a bit more noticeable more consistently. Recap from [address] [place] [address], 2019:  Review of PMH does not reveal any new diagnosis, medications, or surgeries. Review of systems was negative. Neuralgia and essential tremor are stable on present therapy without side effect. She does noted it a little on occasion so she can take an extra 100mg of the gabapentin as needed. With regard to the eye issue, she will continue with her ophthalmologist for now. However, if there are no findings to explain her left eye discomfort, then we can always set her up with neuro-ophthalmology. She will let me know if this become necessary. Follow up yearly or sooner if needed. Current Outpatient Medications   Medication Sig    gabapentin (NEURONTIN) 100 mg capsule One daily for a total of 900mg in the morning and then one po as needed for tremor    gabapentin (NEURONTIN) 400 mg capsule TAKE 2 CAPSULES 3 TIMES A  DAY    tacrolimus (PROGRAF) 1 mg capsule Take 1 Cap by mouth daily.  azaTHIOprine (IMURAN) 50 mg tablet TAKE 2 TABLETS BY MOUTH ONCE DAILY    POTASSIUM CITRATE PO Take 99 mg by mouth.  multivitamin (ONE A DAY) tablet Take 1 Tab by mouth daily.  bumetanide (BUMEX) 1 mg tablet Take  by mouth daily.  potassium chloride SR (KLOR-CON 10) 10 mEq tablet Take 90 mEq by mouth daily.  CALCIUM CARBONATE/MAG CARB/FA (MAGNESIUM-CALCIUM-FOLIC ACID PO) Take  by mouth.  spironolactone (ALDACTONE) 25 mg tablet Take  by mouth daily.  ALIPOIC ACID/BIOFLAV/MV/GR TEA (ULTRA SAMUEL PO) Take  by mouth.  Omeprazole delayed release (PRILOSEC D/R) 20 mg tablet Take 20 mg by mouth two (2) times a day.  WARFARIN SODIUM (WARFARIN PO) Take  by mouth. 1mg/2mg/5mg      No current facility-administered medications for this visit.       Allergies   Allergen Reactions    Codeine Rash    Demerol (Pf) [Meperidine (Pf)] Rash     Past Medical History:   Diagnosis Date    Atrial fib/flutter, transient     Benign meningioma of brain (HCC)     Leg swelling     Liver disease     Memory loss     Skipped beats     SOB (shortness of breath)      Past Surgical History:   Procedure Laterality Date    HX ORTHOPAEDIC      IA ABDOMEN SURGERY PROC UNLISTED      IA CARDIAC SURG PROCEDURE UNLIST  04/30/2013    valve replacement     Social History     Socioeconomic History    Marital status:      Spouse name: Not on file    Number of children: Not on file    Years of education: Not on file    Highest education level: Not on file   Occupational History    Not on file   Social Needs    Financial resource strain: Not on file    Food insecurity     Worry: Not on file     Inability: Not on file    Transportation needs     Medical: Not on file     Non-medical: Not on file   Tobacco Use    Smoking status: Never Smoker    Smokeless tobacco: Never Used   Substance and Sexual Activity    Alcohol use: Yes     Comment: social, wine    Drug use: No    Sexual activity: Not on file   Lifestyle    Physical activity     Days per week: Not on file     Minutes per session: Not on file    Stress: Not on file   Relationships    Social connections     Talks on phone: Not on file     Gets together: Not on file     Attends [employer]ism service: Not on file     Active member of club or organization: Not on file Attends meetings of clubs or organizations: Not on file     Relationship status: Not on file    Intimate partner violence     Fear of current or ex partner: Not on file     Emotionally abused: Not on file     Physically abused: Not on file     Forced sexual activity: Not on file   Other Topics Concern    Not on file   Social History Narrative    Not on file     Family History   Problem Relation Age of Onset    Heart Disease Father        PHYSICAL EXAMINATION:    Visit Vitals  /78 (BP 1 Location: Left upper arm, BP Patient Position: Sitting, BP Cuff Size: Adult)   Pulse 73   Temp 98.3 °F (36.8 °C) (Oral)   Resp 14   Ht 5' 3\" (1.6 m)   Wt 63 kg (139 lb)   SpO2 99%   BMI 24.62 kg/m²     General: Well defined, nourished, and groomed individual in no acute distress.    Neck: Supple, nontender, no bruits, no pain with resistance to active range of motion.    Heart: Regular rate and rhythm, no murmurs, rub, or gallop. Normal S1S2. Lungs: Clear to auscultation bilaterally with equal chest expansion, no cough, no wheeze  Musculoskeletal: Extremities revealed no edema and had full range of motion of joints.    Psych: Good mood and bright affect     NEUROLOGICAL EXAMINATION:    Mental Status: Alert and oriented to person, place, and time     Cranial Nerves:    II, III, IV, VI: Visual acuity grossly intact. Visual fields are normal.    Pupils are equal, round, and reactive to light and accommodation.    Extra-ocular movements are full and fluid. Fundoscopic exam was benign, no ptosis or nystagmus.    V-XII: Hearing is grossly intact. Facial features are symmetric, with normal sensation and strength. The palate rises symmetrically and the tongue protrudes midline. Sternocleidomastoids 5/5.      Motor Examination: Normal tone, bulk, and strength, 5/5 muscle strength throughout.      Coordination: Finger to nose was normal. No resting or intention tremor     Gait and Station: Steady while walking. Normal arm swing.  No pronator drift. No muscle wasting or fasiculations noted.      Reflexes: DTRs 2+ throughout. ASSESSMENT AND PLAN    ICD-10-CM ICD-9-CM    1. Essential tremor  G25.0 333.1    2. Glossopharyngeal neuralgia  G52.1 352.1      Essential tremor is a little more noticeable since her last visit. Suggested bumping the gabapentin to 900mg tid. Otherwise, glossopharyngeal neuralgia is well managed and is not at all problematic. She tolerates the higher dose of gabapentin, I will plan to see her back in 1 year. As always, she can call or come back in earlier if needed. Kara A. Alban Bosworth

## 2021-05-12 NOTE — PROGRESS NOTES
Chief Complaint   Patient presents with    Follow-up     take gabapentin for nerve in the face and for tremors.   needs refill on gabapentin

## 2021-06-03 DIAGNOSIS — G52.1 GLOSSOPHARYNGEAL NEURALGIA: Primary | ICD-10-CM

## 2021-06-03 RX ORDER — GABAPENTIN 300 MG/1
900 CAPSULE ORAL 3 TIMES DAILY
Qty: 810 CAPSULE | Refills: 2 | Status: SHIPPED | OUTPATIENT
Start: 2021-06-03 | End: 2021-01-01

## 2021-06-03 NOTE — TELEPHONE ENCOUNTER
----- Message from Sophie Jackman sent at 6/3/2021  2:42 PM EDT -----  Regarding: PARDEEP Zendejas  Medication Refill    Caller (if not patient): Pt      Relationship of caller (if not patient): Self      Best contact number(s): [phone]      Name of medication and dosage if known: Gabapentin 100mg      Is patient out of this medication (yes/no): 2 weeks remaining      Pharmacy name: [facility] listed in chart? (yes/no): Yes  Pharmacy phone number: [phone]      Details to clarify the request: Pt is requesting a refill of her Gabapentin to be sent to [facility]

## 2021-06-08 RX ORDER — TACROLIMUS 1 MG/1
CAPSULE ORAL
Qty: 90 CAPSULE | Refills: 3 | Status: SHIPPED | OUTPATIENT
Start: 2021-06-08 | End: 2022-01-01

## 2021-06-15 LAB
ALBUMIN SERPL-MCNC: 4.7 G/DL (ref 3.7–4.7)
ALP SERPL-CCNC: 106 IU/L (ref 48–121)
ALT SERPL-CCNC: 14 IU/L (ref 0–32)
AST SERPL-CCNC: 23 IU/L (ref 0–40)
BILIRUB DIRECT SERPL-MCNC: 0.17 MG/DL (ref 0–0.4)
BILIRUB SERPL-MCNC: 0.5 MG/DL (ref 0–1.2)
BUN SERPL-MCNC: 17 MG/DL (ref 8–27)
BUN/CREAT SERPL: 18 (ref 12–28)
CALCIUM SERPL-MCNC: 8.8 MG/DL (ref 8.7–10.3)
CHLORIDE SERPL-SCNC: 98 MMOL/L (ref 96–106)
CO2 SERPL-SCNC: 25 MMOL/L (ref 20–29)
CREAT SERPL-MCNC: 0.93 MG/DL (ref 0.57–1)
GLUCOSE SERPL-MCNC: 94 MG/DL (ref 65–99)
POTASSIUM SERPL-SCNC: 4.5 MMOL/L (ref 3.5–5.2)
PROT SERPL-MCNC: 6.5 G/DL (ref 6–8.5)
SODIUM SERPL-SCNC: 138 MMOL/L (ref 134–144)
TACROLIMUS, 700249: 2.3 NG/ML (ref 2–20)

## 2021-06-17 ENCOUNTER — ANESTHESIA EVENT (OUTPATIENT)
Dept: SURGERY | Age: 81
End: 2021-06-17
Payer: MEDICARE

## 2021-06-18 ENCOUNTER — ANESTHESIA (OUTPATIENT)
Dept: SURGERY | Age: 81
End: 2021-06-18
Payer: MEDICARE

## 2021-06-18 ENCOUNTER — HOSPITAL ENCOUNTER (OUTPATIENT)
Age: 81
Setting detail: OUTPATIENT SURGERY
Discharge: HOME | End: 2021-06-18
Attending: OTOLARYNGOLOGY | Admitting: OTOLARYNGOLOGY
Payer: MEDICARE

## 2021-06-18 VITALS
DIASTOLIC BLOOD PRESSURE: 60 MMHG | TEMPERATURE: 98.1 F | RESPIRATION RATE: 18 BRPM | HEART RATE: 70 BPM | WEIGHT: 138.89 LBS | OXYGEN SATURATION: 98 % | BODY MASS INDEX: 24.61 KG/M2 | HEIGHT: 63 IN | SYSTOLIC BLOOD PRESSURE: 147 MMHG

## 2021-06-18 DIAGNOSIS — M95.2 MOHS DEFECT OF SCALP: Primary | ICD-10-CM

## 2021-06-18 DIAGNOSIS — Z98.890 MOHS DEFECT OF SCALP: Primary | ICD-10-CM

## 2021-06-18 PROCEDURE — 2709999900 HC NON-CHARGEABLE SUPPLY: Performed by: OTOLARYNGOLOGY

## 2021-06-18 PROCEDURE — 77030002986 HC SUT PROL J&J -A: Performed by: OTOLARYNGOLOGY

## 2021-06-18 PROCEDURE — 74011250636 HC RX REV CODE- 250/636: Performed by: ANESTHESIOLOGY

## 2021-06-18 PROCEDURE — 74011000250 HC RX REV CODE- 250: Performed by: OTOLARYNGOLOGY

## 2021-06-18 PROCEDURE — 74011000272 HC RX REV CODE- 272: Performed by: OTOLARYNGOLOGY

## 2021-06-18 PROCEDURE — 77030026438 HC STYL ET INTUB CARD -A: Performed by: ANESTHESIOLOGY

## 2021-06-18 PROCEDURE — 76010000131 HC OR TIME 2 TO 2.5 HR: Performed by: OTOLARYNGOLOGY

## 2021-06-18 PROCEDURE — 74011250636 HC RX REV CODE- 250/636: Performed by: OTOLARYNGOLOGY

## 2021-06-18 PROCEDURE — 74011250636 HC RX REV CODE- 250/636: Performed by: NURSE ANESTHETIST, CERTIFIED REGISTERED

## 2021-06-18 PROCEDURE — 76210000021 HC REC RM PH II 0.5 TO 1 HR: Performed by: OTOLARYNGOLOGY

## 2021-06-18 PROCEDURE — 77030040922 HC BLNKT HYPOTHRM STRY -A

## 2021-06-18 PROCEDURE — 77030008684 HC TU ET CUF COVD -B: Performed by: ANESTHESIOLOGY

## 2021-06-18 PROCEDURE — 76060000035 HC ANESTHESIA 2 TO 2.5 HR: Performed by: OTOLARYNGOLOGY

## 2021-06-18 PROCEDURE — 76210000006 HC OR PH I REC 0.5 TO 1 HR: Performed by: OTOLARYNGOLOGY

## 2021-06-18 PROCEDURE — 74011000250 HC RX REV CODE- 250: Performed by: NURSE ANESTHETIST, CERTIFIED REGISTERED

## 2021-06-18 PROCEDURE — 77030040356 HC CORD BPLR FRCP COVD -A: Performed by: OTOLARYNGOLOGY

## 2021-06-18 PROCEDURE — 77030040361 HC SLV COMPR DVT MDII -B

## 2021-06-18 RX ORDER — DIPHENHYDRAMINE HYDROCHLORIDE 50 MG/ML
12.5 INJECTION, SOLUTION INTRAMUSCULAR; INTRAVENOUS AS NEEDED
Status: DISCONTINUED | OUTPATIENT
Start: 2021-06-18 | End: 2021-06-18 | Stop reason: HOSPADM

## 2021-06-18 RX ORDER — GLYCOPYRROLATE 0.2 MG/ML
INJECTION INTRAMUSCULAR; INTRAVENOUS AS NEEDED
Status: DISCONTINUED | OUTPATIENT
Start: 2021-06-18 | End: 2021-06-18 | Stop reason: HOSPADM

## 2021-06-18 RX ORDER — HYDROMORPHONE HYDROCHLORIDE 1 MG/ML
0.5 INJECTION, SOLUTION INTRAMUSCULAR; INTRAVENOUS; SUBCUTANEOUS
Status: DISCONTINUED | OUTPATIENT
Start: 2021-06-18 | End: 2021-06-18 | Stop reason: HOSPADM

## 2021-06-18 RX ORDER — SUCCINYLCHOLINE CHLORIDE 20 MG/ML
INJECTION INTRAMUSCULAR; INTRAVENOUS AS NEEDED
Status: DISCONTINUED | OUTPATIENT
Start: 2021-06-18 | End: 2021-06-18 | Stop reason: HOSPADM

## 2021-06-18 RX ORDER — BACITRACIN ZINC 500 UNIT/G
OINTMENT (GRAM) TOPICAL AS NEEDED
Status: DISCONTINUED | OUTPATIENT
Start: 2021-06-18 | End: 2021-06-18 | Stop reason: HOSPADM

## 2021-06-18 RX ORDER — HYDROCODONE BITARTRATE AND ACETAMINOPHEN 5; 325 MG/1; MG/1
1 TABLET ORAL
Qty: 10 TABLET | Refills: 0 | Status: SHIPPED | OUTPATIENT
Start: 2021-06-18 | End: 2021-06-21

## 2021-06-18 RX ORDER — ALBUTEROL SULFATE 0.83 MG/ML
2.5 SOLUTION RESPIRATORY (INHALATION) AS NEEDED
Status: DISCONTINUED | OUTPATIENT
Start: 2021-06-18 | End: 2021-06-18 | Stop reason: HOSPADM

## 2021-06-18 RX ORDER — ONDANSETRON 2 MG/ML
4 INJECTION INTRAMUSCULAR; INTRAVENOUS AS NEEDED
Status: DISCONTINUED | OUTPATIENT
Start: 2021-06-18 | End: 2021-06-18 | Stop reason: HOSPADM

## 2021-06-18 RX ORDER — PROPOFOL 10 MG/ML
INJECTION, EMULSION INTRAVENOUS AS NEEDED
Status: DISCONTINUED | OUTPATIENT
Start: 2021-06-18 | End: 2021-06-18 | Stop reason: HOSPADM

## 2021-06-18 RX ORDER — BACITRACIN ZINC 500 UNIT/G
OINTMENT (GRAM) TOPICAL
Qty: 15 G | Refills: 0 | Status: SHIPPED | OUTPATIENT
Start: 2021-06-18 | End: 2022-01-01 | Stop reason: ALTCHOICE

## 2021-06-18 RX ORDER — DEXAMETHASONE SODIUM PHOSPHATE 4 MG/ML
INJECTION, SOLUTION INTRA-ARTICULAR; INTRALESIONAL; INTRAMUSCULAR; INTRAVENOUS; SOFT TISSUE AS NEEDED
Status: DISCONTINUED | OUTPATIENT
Start: 2021-06-18 | End: 2021-06-18 | Stop reason: HOSPADM

## 2021-06-18 RX ORDER — NEOSTIGMINE METHYLSULFATE 1 MG/ML
INJECTION, SOLUTION INTRAVENOUS AS NEEDED
Status: DISCONTINUED | OUTPATIENT
Start: 2021-06-18 | End: 2021-06-18 | Stop reason: HOSPADM

## 2021-06-18 RX ORDER — LIDOCAINE HYDROCHLORIDE AND EPINEPHRINE 10; 10 MG/ML; UG/ML
INJECTION, SOLUTION INFILTRATION; PERINEURAL AS NEEDED
Status: DISCONTINUED | OUTPATIENT
Start: 2021-06-18 | End: 2021-06-18 | Stop reason: HOSPADM

## 2021-06-18 RX ORDER — EPHEDRINE SULFATE/0.9% NACL/PF 50 MG/5 ML
SYRINGE (ML) INTRAVENOUS AS NEEDED
Status: DISCONTINUED | OUTPATIENT
Start: 2021-06-18 | End: 2021-06-18 | Stop reason: HOSPADM

## 2021-06-18 RX ORDER — FENTANYL CITRATE 50 UG/ML
INJECTION, SOLUTION INTRAMUSCULAR; INTRAVENOUS AS NEEDED
Status: DISCONTINUED | OUTPATIENT
Start: 2021-06-18 | End: 2021-06-18 | Stop reason: HOSPADM

## 2021-06-18 RX ORDER — LIDOCAINE HYDROCHLORIDE 10 MG/ML
0.1 INJECTION, SOLUTION EPIDURAL; INFILTRATION; INTRACAUDAL; PERINEURAL AS NEEDED
Status: DISCONTINUED | OUTPATIENT
Start: 2021-06-18 | End: 2021-06-18 | Stop reason: HOSPADM

## 2021-06-18 RX ORDER — ROCURONIUM BROMIDE 10 MG/ML
INJECTION, SOLUTION INTRAVENOUS AS NEEDED
Status: DISCONTINUED | OUTPATIENT
Start: 2021-06-18 | End: 2021-06-18 | Stop reason: HOSPADM

## 2021-06-18 RX ORDER — LIDOCAINE HYDROCHLORIDE 20 MG/ML
INJECTION, SOLUTION EPIDURAL; INFILTRATION; INTRACAUDAL; PERINEURAL AS NEEDED
Status: DISCONTINUED | OUTPATIENT
Start: 2021-06-18 | End: 2021-06-18 | Stop reason: HOSPADM

## 2021-06-18 RX ORDER — SODIUM CHLORIDE, SODIUM LACTATE, POTASSIUM CHLORIDE, CALCIUM CHLORIDE 600; 310; 30; 20 MG/100ML; MG/100ML; MG/100ML; MG/100ML
125 INJECTION, SOLUTION INTRAVENOUS CONTINUOUS
Status: DISCONTINUED | OUTPATIENT
Start: 2021-06-18 | End: 2021-06-18 | Stop reason: HOSPADM

## 2021-06-18 RX ORDER — SODIUM CHLORIDE, SODIUM LACTATE, POTASSIUM CHLORIDE, CALCIUM CHLORIDE 600; 310; 30; 20 MG/100ML; MG/100ML; MG/100ML; MG/100ML
150 INJECTION, SOLUTION INTRAVENOUS CONTINUOUS
Status: DISCONTINUED | OUTPATIENT
Start: 2021-06-18 | End: 2021-06-18 | Stop reason: HOSPADM

## 2021-06-18 RX ADMIN — FENTANYL CITRATE 25 MCG: 0.05 INJECTION, SOLUTION INTRAMUSCULAR; INTRAVENOUS at 13:59

## 2021-06-18 RX ADMIN — PHENYLEPHRINE HYDROCHLORIDE 80 MCG: 10 INJECTION INTRAVENOUS at 15:25

## 2021-06-18 RX ADMIN — ROCURONIUM BROMIDE 20 MG: 10 INJECTION INTRAVENOUS at 14:46

## 2021-06-18 RX ADMIN — ROCURONIUM BROMIDE 5 MG: 10 INJECTION INTRAVENOUS at 14:07

## 2021-06-18 RX ADMIN — PROPOFOL 100 MG: 10 INJECTION, EMULSION INTRAVENOUS at 14:08

## 2021-06-18 RX ADMIN — SUCCINYLCHOLINE CHLORIDE 80 MG: 20 INJECTION, SOLUTION INTRAMUSCULAR; INTRAVENOUS at 14:08

## 2021-06-18 RX ADMIN — FENTANYL CITRATE 50 MCG: 0.05 INJECTION, SOLUTION INTRAMUSCULAR; INTRAVENOUS at 14:08

## 2021-06-18 RX ADMIN — ONDANSETRON 4 MG: 2 INJECTION INTRAMUSCULAR; INTRAVENOUS at 16:55

## 2021-06-18 RX ADMIN — PROPOFOL 40 MG: 10 INJECTION, EMULSION INTRAVENOUS at 14:10

## 2021-06-18 RX ADMIN — CEFAZOLIN SODIUM 2 G: 1 POWDER, FOR SOLUTION INTRAMUSCULAR; INTRAVENOUS at 14:35

## 2021-06-18 RX ADMIN — SUCCINYLCHOLINE CHLORIDE 20 MG: 20 INJECTION, SOLUTION INTRAMUSCULAR; INTRAVENOUS at 14:11

## 2021-06-18 RX ADMIN — FENTANYL CITRATE 25 MCG: 0.05 INJECTION, SOLUTION INTRAMUSCULAR; INTRAVENOUS at 16:11

## 2021-06-18 RX ADMIN — Medication 2 MG: at 16:12

## 2021-06-18 RX ADMIN — PHENYLEPHRINE HYDROCHLORIDE 40 MCG/MIN: 10 INJECTION INTRAVENOUS at 15:31

## 2021-06-18 RX ADMIN — LIDOCAINE HYDROCHLORIDE 50 MG: 20 INJECTION, SOLUTION EPIDURAL; INFILTRATION; INTRACAUDAL; PERINEURAL at 14:08

## 2021-06-18 RX ADMIN — Medication 300 MCG: at 15:04

## 2021-06-18 RX ADMIN — ROCURONIUM BROMIDE 25 MG: 10 INJECTION INTRAVENOUS at 14:12

## 2021-06-18 RX ADMIN — GLYCOPYRROLATE 0.3 MG: 0.2 INJECTION INTRAMUSCULAR; INTRAVENOUS at 16:12

## 2021-06-18 RX ADMIN — PHENYLEPHRINE HYDROCHLORIDE 120 MCG: 10 INJECTION INTRAVENOUS at 15:02

## 2021-06-18 RX ADMIN — PHENYLEPHRINE HYDROCHLORIDE 80 MCG: 10 INJECTION INTRAVENOUS at 14:30

## 2021-06-18 RX ADMIN — DEXAMETHASONE SODIUM PHOSPHATE 4 MG: 4 INJECTION, SOLUTION INTRAMUSCULAR; INTRAVENOUS at 14:48

## 2021-06-18 RX ADMIN — PROPOFOL 20 MG: 10 INJECTION, EMULSION INTRAVENOUS at 14:12

## 2021-06-18 RX ADMIN — SODIUM CHLORIDE, POTASSIUM CHLORIDE, SODIUM LACTATE AND CALCIUM CHLORIDE 150 ML/HR: 600; 310; 30; 20 INJECTION, SOLUTION INTRAVENOUS at 13:44

## 2021-06-18 RX ADMIN — PHENYLEPHRINE HYDROCHLORIDE 80 MCG: 10 INJECTION INTRAVENOUS at 14:57

## 2021-06-18 NOTE — DISCHARGE INSTRUCTIONS
Patient Discharge Instructions    Jesus Hammond / [id number] : 1940    Admitted 2021 Discharged: 2021            Facial Reconstruction Surgery Post-Operative Instructions      Have someone help you at home for 1-2 days.  Get plenty of rest.   Follow a balanced diet.  Take pain medication as prescribed.  Do not take aspirin (or products that contain aspirin) unless approved by your surgeon.  Do not drink alcohol while taking pain medication.  DO NOT SMOKE. Smoking decreases blood flow. It can delay wound healing or cause tissue loss. Activities   Start walking as soon as possible, this helps to reduce swelling and lowers the chance of blood clots.  Do not drive until you are no longer taking pain medication (narcotics).  You may tire easily. Plan on taking it easy for the first week.  No strenuous activity, including sex and heavy housework for 2 weeks post op.  Avoid bending over.  Keep your head elevated. Incision Care    Primary Closure   Remove dressing in 24 hours. You will see sutures as well as 2 areas where yellow vaseline gauze is sewn onto the scalp. Leave these in place. Begin applying antibiotic ointment 2-3x/day on all the sutures and the vaseline gauze.  You may shower after 48 hours. Keep the incision lines coated with ointment while in the shower and avoid letting water spray directly on the incision.  Crust/dried blood around stitches can be gently removed using a q-tip moistened with hydrogen peroxide. Keeping the sutures coated with ointment can help prevent crust formation. What To Expect   Bruising, swelling, numbness, tightness and tenderness of skin for 10 - 14 days.  Maximum discomfort should occur in the first few days, improving each day thereafter.  Minor oozing of blood between the sutures and under the Vaseline gauze is normal and expected. When to Call   If you have increased swelling or bruising.  If you have increased redness along the incision.  If you have increased pain that is not relieved by your pain medication.  If you have any side effect from your medication; rash, nausea, vomiting, diarrhea, etc.    If you have a temperature greater than 101.0F    If you have yellow or green drainage from the incisions or notice a foul odor.  If you have bleeding from the incisions that is difficult to control with light pressure. Follow-up with Bernardo Nur MD  on Thursday, 6/24/21 9:00am    If you have any questions, please call us at [phone]. We are always happy to answer your questions, and if you should have a problem, this number is answered 24 hours a day. DISCHARGE SUMMARY from your Nurse      PATIENT INSTRUCTIONS    After general anesthesia or intravenous sedation, for 24 hours or while taking prescription Narcotics:  · Limit your activities  · Do not drive and operate hazardous machinery  · Do not make important personal or business decisions  · Do  not drink alcoholic beverages  · If you have not urinated within 8 hours after discharge, please contact your surgeon on call. Report the following to your surgeon:  · Excessive pain, swelling, redness or odor of or around the surgical area  · Temperature over 100.5  · Nausea and vomiting lasting longer than 4 hours or if unable to take medications  · Any signs of decreased circulation or nerve impairment to extremity: change in color, persistent  numbness, tingling, coldness or increase pain  · Any questions      GOOD HELP TO FIGHT AN INFECTION  Here are a few tip to help reduce the chance of getting an infection after surgery:   Wash Your Hands   Good handwashing is the most important thing you and your caregiver can do.  Wash before and after caring for any wounds. Dry your hand with a clean towel.  Wash with soap and water for at least 20 seconds.  A TIP: sing the \"Happy Birthday\" song through one time while washing to help with the timing.  Use a hand  in between washings.  Shower   When your surgeon says it is OK to take a shower, use a new bar of antibacterial soap (if that is what you use, and keep that bar of soap ONLY for your use), or antibacterial body wash.  Use a clean wash cloth or sponge when you bathe.  Dry off with a clean towel  after every bath - be careful around any wounds, skin staples, sutures or surgical glue over/on wounds.  Do not enter swimming pools, hot tubs, lakes, rivers and/or ocean until wounds are healed and your doctor/surgeon says it is OK.  Use Clean Sheets   Sleep on freshly laundered sheets after your surgery.  Keep the surgery site covered with a clean, dry bandage (if instructed to do so). If the bandage becomes soiled, reapply a new, dry, clean bandage.  Do not allow pets to sleep with you while your wound is healing.  Lifestyle Modification and Controlling Your Blood Sugar   Smoking slows wound healing. Stop smoking and limit exposure to second-hand smoke.  High blood sugar slows wound healing. Eat a well-balanced diet to provide proper nutrition while healing   Monitor your blood sugar (if you are a diabetic) and take your medications as you are suppose to so you can control you blood sugar after surgery. COUGH AND DEEP BREATHE    Breathing deeply and coughing are very important exercises to do after surgery. Deep breathing and coughing open the little air tubes and air sacks in your lungs. You take deep breaths every day. You may not even notice - it is just something you do when you sigh or yawn. It is a natural exercise you do to keep these air passages open. After surgery, take deep breaths and cough, on purpose. DIRECTIONS:  · Take 10 to 15 slow deep breaths every hour while awake. · Breathe in deeply, and hold it for 2 seconds. · Exhale slowly through puckered lips, like blowing up a balloon.   · After every 4th or 5th deep breath, hug your pillow to your chest or belly and give a hard, deep cough. Yes, it will probably hurt. But doing this exercise is a very important part of healing after surgery. Take your pain medicine to help you do this exercise without too much pain. Coughing and deep breathing help prevent bronchitis and pneumonia after surgery. If you had chest or belly surgery, use a pillow as a \"hug michael\" and hold it tightly to your chest or belly when you cough. ANKLE PUMPS    Ankle pumps increase the circulation of oxygenated blood to your lower extremities and decrease your risk for circulation problems such as blood clots. They also stretch the muscles, tendons and ligaments in your foot and ankle, and prevent joint contracture in the ankle and foot, especially after surgeries on the legs. It is important to do ankle pump exercises regularly after surgery because immobility increases your risk for developing a blood clot. Your doctor may also have you take an Aspirin for the next few days as well. If your doctor did not ask you to take an Aspirin, consult with him before starting Aspirin therapy on your own. The exercise is quite simple. · Slowly point your foot forward, feeling the muscles on the top of your lower leg stretch, and hold this position for 5 seconds. · Next, pull your foot back toward you as far as possible, stretching the calf muscles, and hold that position for 5 seconds. · Repeat with the other foot. · Perform 10 repetitions every hour while awake for both ankles if possible (down and then up with the foot once is one repetition). You should feel gentle stretching of the muscles in your lower leg when doing this exercise. If you feel pain, or your range of motion is limited, don't push too hard. Only go the limit your joint and muscles will let you go.   If you have increasing pain, progressively worsening leg warmth or swelling, STOP the exercise and call your doctor. These are general instructions for a healthy lifestyle:    *   Please give a list of your current medications to your Primary Care Provider. *   Please update this list whenever your medications are discontinued, doses are changed, or new medications (including over-the-counter products) are added. *   Please carry medication information at all times in case of emergency situations. About Smoking  No smoking / No tobacco products  Avoid exposure to second hand smoke     Surgeon General's Warning:  Quitting smoking now greatly reduces serious risk to your health. Obesity, smoking, and sedentary lifestyle greatly increases your risk for illness and disease. A healthy diet, regular physical exercise & weight monitoring are important for maintaining a healthy lifestyle. Congestive Heart Failure  You may be retaining fluid if you have a history of heart failure or if you experience any of the following symptoms:  Weight gain of 3 pounds or more overnight or 5 pounds in a week, increased swelling in your hands or feet or shortness of breath while lying flat in bed. Please call your doctor as soon as you notice any of these symptoms; do not wait until your next office visit. Learning About Coronavirus [phone])  Coronavirus [phone]): Overview  What is coronavirus (COVID-19)? The coronavirus disease (COVID-19) is caused by a virus. It is an illness that was first found in [place], [place], in December 2019. It has since spread worldwide. The virus can cause fever, cough, and trouble breathing. In severe cases, it can cause pneumonia and make it hard to breathe without help. It can cause death. Coronaviruses are a large group of viruses. They cause the common cold. They also cause more serious illnesses like Middle East respiratory syndrome (MERS) and severe acute respiratory syndrome (SARS). COVID-19 is caused by a novel coronavirus. That means it's a new type that has not been seen in people before. This virus spreads person-to-person through droplets from coughing and sneezing. It can also spread when you are close to someone who is infected. And it can spread when you touch something that has the virus on it, such as a doorknob or a tabletop. What can you do to protect yourself from coronavirus (COVID-19)? The best way to protect yourself from getting sick is to:  · Avoid areas where there is an outbreak. · Avoid contact with people who may be infected. · Wash your hands often with soap or alcohol-based hand sanitizers. · Avoid crowds and try to stay at least 6 feet away from other people. · Wash your hands often, especially after you cough or sneeze. Use soap and water, and scrub for at least 20 seconds. If soap and water aren't available, use an alcohol-based hand . · Avoid touching your mouth, nose, and eyes. What can you do to avoid spreading the virus to others? To help avoid spreading the virus to others:  · Cover your mouth with a tissue when you cough or sneeze. Then throw the tissue in the trash. · Use a disinfectant to clean things that you touch often. · Stay home if you are sick or have been exposed to the virus. Don't go to school, work, or public areas. And don't use public transportation. · If you are sick:  ? Leave your home only if you need to get medical care. But call the doctor's office first so they know you're coming. And wear a face mask, if you have one.  ? If you have a face mask, wear it whenever you're around other people. It can help stop the spread of the virus when you cough or sneeze. ? Clean and disinfect your home every day. Use household  and disinfectant wipes or sprays. Take special care to clean things that you grab with your hands. These include doorknobs, remote controls, phones, and handles on your refrigerator and microwave.  And don't forget countertops, tabletops, bathrooms, and computer keyboards. When to call for help  Call 911 anytime you think you may need emergency care. For example, call if:  · You have severe trouble breathing. (You can't talk at all.)  · You have constant chest pain or pressure. · You are severely dizzy or lightheaded. · You are confused or can't think clearly. · Your face and lips have a blue color. · You pass out (lose consciousness) or are very hard to wake up. Call your doctor now if you develop symptoms such as:  · Shortness of breath. · Fever. · Cough. If you need to get care, call ahead to the doctor's office for instructions before you go. Make sure you wear a face mask, if you have one, to prevent exposing other people to the virus. Where can you get the latest information? The following health organizations are tracking and studying this virus. Their websites contain the most up-to-date information. Jeanine Molina also learn what to do if you think you may have been exposed to the virus. · U.S. Centers for Disease Control and Prevention (CDC): The CDC provides updated news about the disease and travel advice. The website also tells you how to prevent the spread of infection. www.cdc.gov  · World Health Organization [facility]): WHO offers information about the virus outbreaks. WHO also has travel advice. www.who.int  Current as of: April 1, 2020               Content Version: 12.4  © 2617-9619 [employer]. Care instructions adapted under license by your healthcare professional. If you have questions about a medical condition or this instruction, always ask your healthcare professional. [place] [address] any warranty or liability for your use of this information. The discharge information has been reviewed with the patient and caregiver. Any questions and concerns from the patient and caregiver have been addressed. The patient and caregiver verbalized understanding.         CONTENTS FOUND IN YOUR DISCHARGE ENVELOPE:  [x]     Surgeon and Hospital Discharge Instructions  [x]     [facility] Care Provider Card  []     Medication & Side Effect Guide            (your newly prescribed medications have been marked/highlighted showing the most common side effects from   the classes of drugs on your prescriptions)  []     Medication Prescription(s) x 0 ( [] These have been sent electronically to your pharmacy by your surgeon,   - OR -       your surgeon has already provided these to you during a previous/pre-op office visit)  []     [address]  []     Physical Therapy Prescription  []     Follow-up Appointment Cards  []     Surgery-related Pictures/Media  []     Pain block and/or block with On-Q Catheter from Anesthesia Service (information included in your instructions above)  []     Medical device information sheets/pamphlets from their    []     School/work excuse note. []     /parent work excuse note. The following personal items collected during your admission are returned to you:   Dental Appliance: Dental Appliances: Partials, Lowers, Uppers, At home  Vision: Visual Aid: Glasses, With patient  Hearing Aid:    Jewelry: Jewelry: None  Clothing: Clothing: Other (comment) (street clothes)  Other Valuables:  Other Valuables: Eyeglasses  Valuables sent to safe: Personal Items Sent to Safe: none

## 2021-06-18 NOTE — ANESTHESIA PREPROCEDURE EVALUATION
Relevant Problems   No relevant active problems       Anesthetic History   No history of anesthetic complications            Review of Systems / Medical History  Patient summary reviewed, nursing notes reviewed and pertinent labs reviewed    Pulmonary  Within defined limits                 Neuro/Psych   Within defined limits           Cardiovascular      Valvular problems/murmurs (s/p replacement)      Dysrhythmias : atrial fibrillation  Pacemaker         GI/Hepatic/Renal  Within defined limits             Comments: S/p liver transplant Endo/Other  Within defined limits           Other Findings              Physical Exam    Airway  Mallampati: II  TM Distance: 4 - 6 cm  Neck ROM: normal range of motion   Mouth opening: Normal     Cardiovascular    Rhythm: regular  Rate: normal         Dental    Dentition: Lower partial plate and Upper partial plate     Pulmonary  Breath sounds clear to auscultation               Abdominal         Other Findings            Anesthetic Plan    ASA: 3  Anesthesia type: general          Induction: Intravenous  Anesthetic plan and risks discussed with: Patient

## 2021-06-18 NOTE — BRIEF OP NOTE
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       Procedure(s):  CLOSURE OF SCALP MOHS DEFECT  WITH LOCAL FLAP, POSSIBLE SKIN GRAFT (ANES GENERAL ET) (URGENT)    Surgeon(s):  Germain Chavez MD    Surgical Assistant: None    Anesthesia: General     Estimated Blood Loss (mL): Minimal    Complications: None    Specimens: * No specimens in log *     Implants: * No implants in log *    Drains: * No LDAs found *    Findings: 6x4cm scalp defect vertex    Electronically Signed by Jahaira Wilde MD on 6/18/2021 at 4:27 PM

## 2021-06-18 NOTE — H&P
Otolaryngology, Head and Neck Surgery    Chief Complaint:    History of Present Illness:   Patient is a 79 y.o. female who is being seen for scalp Mohs reconstruction. S/p excision of SCCA by Dr. Brady Hoover few days ago. Has vertex defect. Past Medical History:   Diagnosis Date    Atrial fib/flutter, transient     Benign meningioma of brain ([address]     COVID-19 vaccine series completed 04/10/2021    Moderna    Leg swelling     Liver disease     Memory loss     Skipped beats     SOB (shortness of breath)       Family History   Problem Relation Age of Onset    Heart Disease Father       Social History     Tobacco Use    Smoking status: Never Smoker    Smokeless tobacco: Never Used   Substance Use Topics    Alcohol use: Yes     Comment: social, wine     Past Surgical History:   Procedure Laterality Date    HX ORTHOPAEDIC      ND ABDOMEN SURGERY PROC UNLISTED      ND CARDIAC SURG PROCEDURE UNLIST  04/30/2013    valve replacement      Current Facility-Administered Medications   Medication Dose Route Frequency    lactated Ringers infusion  150 mL/hr IntraVENous CONTINUOUS    lidocaine (PF) (XYLOCAINE) 10 mg/mL (1 %) injection 0.1 mL  0.1 mL SubCUTAneous PRN    ceFAZolin (ANCEF) 2 g in sterile water (preservative free) 20 mL IV syringe  2 g IntraVENous ON CALL TO OR      Allergies   Allergen Reactions    Codeine Rash    Demerol (Pf) [Meperidine (Pf)] Rash        Review of Systems:  Pertinent items are noted in the History of Present Illness. Objective:     No data found. No data recorded. No intake/output data recorded. PHYSICAL EXAM:    CONSTITUTIONAL:  Well nourished individual in no acute distress. The patient is able to communicate with normal voice quality. HEAD AND NECK EXAM:    HEAD & FACE: No head or facial abnormalities present. No masses or lesions present.   Overall appearance is normal. Facial strength is normal.  EYES: Conjunctiva normal.  No abnormalities of the lids or globes. Extraocular movements intact and conjugate. EARS: No significant abnormality of the external ear. NOSE: No significant external nasal lesions. No turbinate hypertrophy or mucosal lesions. No polyps, masses or purulence seen anteriorly. No edema. No significant septal deviation. SINUSES: The paranasal sinus regions are non-tender to palpation. ORAL CAVITY/OROPHARYNX: Lips and gums are without lesions. Oral cavity and oropharynx are without mucosal lesions or abnormalities. Tonsillar fossae, palate, tongue and uvula without abnormality. No edema. No erythema. NECK: No palpable lymphadenopathy or other masses in the neck. The trachea and larynx are midline. No thyroid enlargement or nodules appreciated. No abnormality of the parotid or submandibular glands present. LYMPHATIC: No lymphadenopathy in the neck/head. CHEST:  CTA  HEART:  RRR  NEUROLOGIC/PSYCHIATRIC:    NEUROLOGIC:  Cranial nerves 3, 4, 5, 6, 7, 10 (soft palate elevation), 11 and 12 bilaterally intact and symmetric. ORIENTATION/MOOD/AFFECT: Oriented to person, place, time and general circumstances. Mood and affect appropriate. Assessment:     Scalp Mohs defect    Plan:     Ready to proceed with scalp reconstruction, likely local flap. Questions answered. Consent signed. Signed By: Anmol Abebe MD     June 18, 2021       Douglas Foote MD, [facility], [facility]  [email or web address]  [email or web address]  [address] [place] [address], [address] [facility],[address] [place] [address]  Ph:  [phone]  Fax: [phone]

## 2021-06-18 NOTE — ANESTHESIA POSTPROCEDURE EVALUATION
Procedure(s):  CLOSURE OF SCALP MOHS DEFECT  WITH LOCAL FLAP, POSSIBLE SKIN GRAFT (ANES GENERAL ET) (URGENT). general    Anesthesia Post Evaluation        Patient location during evaluation: PACU  Level of consciousness: awake  Pain management: adequate  Airway patency: patent  Anesthetic complications: no  Cardiovascular status: acceptable  Respiratory status: acceptable  Hydration status: acceptable  Post anesthesia nausea and vomiting:  none      INITIAL Post-op Vital signs:   Vitals Value Taken Time   /60 06/18/21 1715   Temp 36.9 °C (98.4 °F) 06/18/21 1630   Pulse 70 06/18/21 1721   Resp 21 06/18/21 1721   SpO2 91 % 06/18/21 1721   Vitals shown include unvalidated device data.

## 2021-06-19 NOTE — OP NOTES
[facility]  OPERATIVE REPORT    Name:  Renee Quevedo  MR#:  [id number]  :  1940  ACCOUNT #:  [de-identified]  DATE OF SERVICE:  2021    PREOPERATIVE DIAGNOSIS:  Scalp Mohs defect. POSTOPERATIVE DIAGNOSIS:  Scalp Mohs defect. PROCEDURE PERFORMED:  Local flap closure of scalp Mohs defect, total area of the flap approximately 40 cm2. SURGEON:  Renan Valenzuela MD    ASSISTANT:  none    ANESTHESIA:  general.    COMPLICATIONS:  none. SPECIMENS REMOVED:  none. IMPLANTS:  none. ESTIMATED BLOOD LOSS:  min. 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 Rufina Mayers the day before this procedure. She presents for reconstruction. FINDINGS:  There was approximately 6 x 4 cm defect in the right vertex scalp. There was an area centrally about 3 cm that was devoid of periosteum. The patient's scalp was extremely tight and required extensive dissection to mobilize the flaps. PROCEDURE:  After informed consent, the patient was taken to the operating room and placed on the table in the supine position. After general anesthesia, the table was turned 180 degrees. The dressing on the scalp was removed and the defect was examined. The planned incisions were marked. The hair was trimmed and tied and then the area was injected with 1% lidocaine with 1:100,000 epinephrine. The patient was prepped and draped in standard fashion. Initially, the incisions were made with a 15-blade beveling with the hair follicles. The scalp was elevated in the subgaleal plane with finger dissection, essentially, the entire scalp as far as it could be reached to try to mobilize the flaps. The incisions were made in a curvilinear fashion on either end of the defect extending anteriorly and then posteriorly on opposite sides to perform advancement rotation flaps for closure.   Small backcuts were made about 1.5 cm on both flaps. Bipolar cautery was used for minor oozing although there was minimal bleeding. The flaps were rotated into position. They were somewhat tight but certainly it came together over the defect. The area that was devoid of periosteum was burred with a 5 mm cutting memo until pinpoint bleeding was noted. This was done over the entire area that was devoid of periosteum. This was done for, hopefully, better margin control as the periosteum was positive according to Dr. Trish Dunham. The area was copiously irrigated with antibiotic saline including under the flaps and then suctioned. The flaps were rotated into position and then closed with full-thickness 3-0 Prolene sutures interrupted and running locking. At the area of the backcut, there were small areas that would not close without undue tension, so these areas were packed with Xeroform gauze and then tied over with 3-0 Prolene again. The patient's scalp was cleaned and washed. The incisions were coated with bacitracin and then after, dressing of Telfa, fluffs, Kerlix, and Coban was placed. This concluded the procedure. The patient was then awakened from anesthesia, extubated, and taken to the PACU in stable condition. There were no complications. The patient tolerated the procedure well.       MD DNE [id number]  D:  06/18/2021 16:39  T:  06/19/2021 3:49  JOB #:  [id number]

## 2021-07-26 ENCOUNTER — OFFICE VISIT (OUTPATIENT)
Dept: HEMATOLOGY | Age: 81
End: 2021-07-26
Payer: MEDICARE

## 2021-07-26 VITALS
BODY MASS INDEX: 25.16 KG/M2 | TEMPERATURE: 97 F | HEART RATE: 63 BPM | DIASTOLIC BLOOD PRESSURE: 73 MMHG | RESPIRATION RATE: 16 BRPM | OXYGEN SATURATION: 96 % | SYSTOLIC BLOOD PRESSURE: 137 MMHG | WEIGHT: 142 LBS | HEIGHT: 63 IN

## 2021-07-26 DIAGNOSIS — Z79.899 LONG TERM CURRENT USE OF IMMUNOSUPPRESSIVE DRUG: ICD-10-CM

## 2021-07-26 DIAGNOSIS — Z94.4 H/O LIVER TRANSPLANT (HCC): Primary | ICD-10-CM

## 2021-07-26 PROCEDURE — 1101F PT FALLS ASSESS-DOCD LE1/YR: CPT | Performed by: NURSE PRACTITIONER

## 2021-07-26 PROCEDURE — 99214 OFFICE O/P EST MOD 30 MIN: CPT | Performed by: NURSE PRACTITIONER

## 2021-07-26 PROCEDURE — 1090F PRES/ABSN URINE INCON ASSESS: CPT | Performed by: NURSE PRACTITIONER

## 2021-07-26 PROCEDURE — G8432 DEP SCR NOT DOC, RNG: HCPCS | Performed by: NURSE PRACTITIONER

## 2021-07-26 PROCEDURE — G8427 DOCREV CUR MEDS BY ELIG CLIN: HCPCS | Performed by: NURSE PRACTITIONER

## 2021-07-26 PROCEDURE — G8419 CALC BMI OUT NRM PARAM NOF/U: HCPCS | Performed by: NURSE PRACTITIONER

## 2021-07-26 PROCEDURE — G8536 NO DOC ELDER MAL SCRN: HCPCS | Performed by: NURSE PRACTITIONER

## 2021-07-26 NOTE — PROGRESS NOTES
Identified pt with two pt identifiers(name and ). Reviewed record in preparation for visit and have obtained necessary documentation. No chief complaint on file. Vitals:    21 1315   BP: 137/73   Pulse: 63   Resp: 16   Temp: 97 °F (36.1 °C)   TempSrc: Temporal   SpO2: 96%   Weight: 142 lb (64.4 kg)   Height: 5' 3\" (1.6 m)   PainSc:   0 - No pain       Health Maintenance Review: Patient reminded of \"due or due soon\" health maintenance. I have asked the patient to contact his/her primary care provider (PCP) for follow-up on his/her health maintenance. Coordination of Care Questionnaire:  :   1) Have you been to an emergency room, urgent care, or hospitalized since your last visit? If yes, where when, and reason for visit? Yes, [facility] 2021 chest pain and pressure      2. Have seen or consulted any other health care provider since your last visit? If yes, where when, and reason for visit? YES, f/u with Cardiologist    Patient is accompanied by self I have received verbal consent from Michael Donahue to discuss any/all medical information while they are present in the room.

## 2021-07-26 NOTE — PROGRESS NOTES
[facility] [place], MD, Joellyn Grieve, MD Caryle Bos, JENNIFER Morrissey, [facility]     April Ricky Villar, PARDEEP Henry, PARDEEP Herrera, PARDEEP Herrera [facility]    at [address], [facility].    [phone]    FAX: [address], [address]    [phone]    FAX: [phone]       Patient Care Team:  Yasmeen Mora MD as PCP - General (Family Medicine)  Reid Zhang MD (Family Medicine)  Kathryn Medina MD (Cardiology)      Problem List  Date Reviewed: 7/27/2021        Codes Class Noted    Iron deficiency anemia secondary to inadequate dietary iron intake ICD-10-CM: D50.8  ICD-9-CM: 280.1  3/12/2021        Long term current use of immunosuppressive drug ICD-10-CM: Z79.899  ICD-9-CM: V58.69  3/27/2017        Liver transplant complication ([facility].) RFO-45-Serbian: T86.40  ICD-9-CM: 996.82  4/26/5198        Diastolic congestive heart failure ([facility].) ICD-10-CM: I50.30  ICD-9-CM: 428.30, 428.0  9/29/2016        H/O liver transplant [facility]) ICD-10-CM: Z94.4  ICD-9-CM: V42.7  8/17/2016        S/P cataract surgery ICD-10-CM: Z98.49  ICD-9-CM: V45.61  8/17/2016        Osteoporosis ICD-10-CM: M81.0  ICD-9-CM: 733.00  8/17/2016        H/O cervical spine surgery ICD-10-CM: Z98.890  ICD-9-CM: V45.89  8/17/2016        Basal cell carcinoma ICD-10-CM: C44.91  ICD-9-CM: 173.91  8/17/2016    Overview Signed 8/17/2016 11:28 AM by Germain Oviedo MD     Multiple locations, face, back, neck, legs             S/P aortic valve replacement ICD-10-CM: Z95.2  ICD-9-CM: V43.3  8/17/2016        Pacemaker ICD-10-CM: Z95.0  ICD-9-CM: V45.01  8/17/2016        Essential tremor ICD-10-CM: G25.0  ICD-9-CM: 333.1  8/12/2013        Meningioma ([address] ICD-10-CM: D32.9  ICD-9-CM: 225.2  8/12/2013        Leg swelling ICD-10-CM: M79.89  ICD-9-CM: 729.81  Unknown        Atrial fib/flutter, transient ICD-9-CM: GTN7788  Unknown        Glossopharyngeal neuralgia ICD-10-CM: 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 The active problem list, all pertinent past medical history, medications, radiologic findings and laboratory findings related to the liver disorder were reviewed with the patient. The patient is a 79 y.o.  female who underwent a liver transplant in 2003 at [facility] for cirrhosis and hepatocellular carcinoma. She is unaware of the underlying liver disease that resulted in cirrhosis. She was followed closely at [facility] after the LT for 3 years, then moved to [facility] for 5 years. She has been in [place] since 2011. The patient had an episode of acute chest pain and was evaluated in the ER at [facility]. She had lower extremity edema, fluctuation of tacrolimus levels. Since the last office visit the patient has had a 15 lbs weight loss. TAC levels have normalized. She feels well overall with no new complaints. The patient reports feeling better than she had in several years. There are no symptoms of liver disease. She completes all daily activities without any functional limitations. She has not experienced swelling of the abdomen, problems with concentration or itching. ASSESSMENT AND PLAN:  Liver transplant   This was performed at [facility] in 2003. Laboratory testing from 6/14/2021 reviewed in detail. The liver transaminases, ALP, liver function and platelet count are normal.   There have been no episodes of graft rejection following transplant.      Immune Suppression  Current dose of tacrolimus is 1 mg daily. Levels have been therapeutic ranging 2-7. Will continue at the current dose. Immuran is currently at 100 mg twice daily. Will continue at this dose. Anemia  The HGB has been ranging 9-10 g/dl. This is most likely iron deficiency anemia. The levels are low normal. She is on oral iron supplementation. B12 and folate levels are normal.   She defers having a colonoscopy. PCP should order cologuard. Hepatocellular carcinoma  Incidental finding in the native liver at the time of transplant. Follow up screening is discontinued after 5 years post-transplant. No further testing required. Chronic kidney disease  Sr. Creatinine is normal.   NSAIDs should not be utilized as this may worsen CKD. Hypercholesterolemia   This is a common side effect of immune supression. She does not have hypercholesterolemia    Hypertension  This is a common side effect of immune suppression with calcineurin agents. She does not have hypertension at this time. Treatment of other medical problems in patients with chronic liver disease  There are no contraindications for the patient to take any medications that are necessary for treatment of other medical issues. The patient does not consume alcohol daily. Normal doses of acetaminophen can be used for pain as needed. Counseling for alcohol in patients with chronic liver disease  The patient was counseled regarding alcohol consumption and the effect of alcohol on chronic liver disease. The patient has normal liver function. She can consume an occasional alcoholic beverage. Multiple skin cancers   The patient has developed multiple skin cancers. Pathology has been basal and squamous. Skin cancers are common after liver transplant due to chronic immujne suppression. She should continue to be monitored by Dermatology. Colonoscopy  The patient reports doing a Cologuard screening 2-3 years ago. She defers colonoscopy.  Cologuard should be ordered by the PCP. Osteoporosis  The risk of osteoporosis is increased in following liver transplantation. DEXA bone density was completed 7/2019 which demonstrated osteopenia. Repeat testing will be due this year. Hypomagnesia  Low serum magnesium can be caused by immune suppression. Will check serum mag and provide oral supplement if low. Vaccinations   Routine vaccinations against other bacterial and viral agents can be performed as long as live vaccines are not utilizied. Annual flu vaccination should be administered if indicated. Allergies   Allergen Reactions    Codeine Rash    Demerol (Pf) [Meperidine (Pf)] Rash       Current Outpatient Medications   Medication Sig    bacitracin zinc (BACITRACIN) ointment Apply to incision as directed 3x/day    tacrolimus (PROGRAF) 1 mg capsule TAKE 1 CAPSULE BY MOUTH DAILY. AVOID GRAPEFRUIT    gabapentin (NEURONTIN) 300 mg capsule Take 3 Capsules by mouth three (3) times daily. Max Daily Amount: 2,700 mg.    azaTHIOprine (IMURAN) 50 mg tablet TAKE 2 TABLETS BY MOUTH ONCE DAILY    POTASSIUM CITRATE PO Take 99 mg by mouth.  multivitamin (ONE A DAY) tablet Take 1 Tab by mouth daily.  bumetanide (BUMEX) 1 mg tablet Take  by mouth daily.  potassium chloride SR (KLOR-CON 10) 10 mEq tablet Take 90 mEq by mouth daily.  CALCIUM CARBONATE/MAG CARB/FA (MAGNESIUM-CALCIUM-FOLIC ACID PO) Take  by mouth.  spironolactone (ALDACTONE) 25 mg tablet Take  by mouth daily.  ALIPOIC ACID/BIOFLAV/MV/GR TEA (ULTRA SAMUEL PO) Take  by mouth.  Omeprazole delayed release (PRILOSEC D/R) 20 mg tablet Take 20 mg by mouth two (2) times a day.  WARFARIN SODIUM (WARFARIN PO) Take  by mouth. 1mg/2mg/5mg      No current facility-administered medications for this visit. SYSTEM REVIEW NOT RELATED TO LIVER DISEASE OR REVIEWED ABOVE:  Constitution systems: Negative for fever, chills, weight gain, weight loss. Eyes: Negative for visual changes.   ENT: Negative for sore throat, painful swallowing. Respiratory: Negative for cough, hemoptysis, SOB. Cardiology: Negative for chest pain, palpitations. H/O heart valve surgery. GI:  Negative for constipation or diarrhea. : Negative for urinary frequency, dysuria, hematuria, nocturia. Skin: Negative for rash. H/O skin cancer  Hematology: Negative for easy bruising, blood clots. Musculo-skelatal: Negative for back pain, muscle pain, weakness. Neurologic: Negative for headaches, dizziness, vertigo, memory problems not related to HE. Psychology: Negative for anxiety, depression. FAMILY HISTORY:  There is no family history of liver disease  There is no family history of heart disease    SOCIAL HISTORY:  The patient is . The patient has 3 children, 3 stepchildren, and 10 grandchildren, 1 great grandchild. The patient has never used tobacco products. The patient has never consumed significant amounts of alcohol. The patient used to work as in many different jobs. PHYSICAL EXAMINATION:  Visit Vitals  /73 (BP 1 Location: Right upper arm, BP Patient Position: Sitting, BP Cuff Size: Adult)   Pulse 63   Temp 97 °F (36.1 °C) (Temporal)   Resp 16   Ht 5' 3\" (1.6 m)   Wt 142 lb (64.4 kg)   SpO2 96%   BMI 25.15 kg/m²       General: No acute distress. Eyes: Sclera anicteric. ENT: No oral lesions. Thyroid normal.  Nodes: No adenopathy. Skin: No spider angiomata. No jaundice. No palmar erythema. Respiratory: Lungs clear to auscultation. Cardiovascular: Regular heart rate. No JVD. Systolic murmur. Abdomen: Soft non-tender, liver size normal to percussion/palpation. Spleen not palpable. No obvious ascites. Extremities: No edema. No muscle wasting. No gross arthritic changes. Neurologic: Alert and oriented. Cranial nerves grossly intact. No asterixis.     LABORATORY STUDIES:  Liver [place] of [address] & Units 6/14/2021 3/29/2021 3/5/2021   WBC 3.4 - 10.8 x10E3/uL  5.5    ANC 1.4 - 7.0 x10E3/uL  3.9    HGB 11.1 - 15.9 g/dL  8.9 (L)     - 450 x10E3/uL  193    INR 0.9 - 1.2  1.4 (H)    AST 0 - 40 IU/L 23 25 23   ALT 0 - 32 IU/L 14 12 10   Alk Phos 48 - 121 IU/L 106 97 96   Bili, Total 0.0 - 1.2 mg/dL 0.5 0.6 0.4   Bili, Direct 0.00 - 0.40 mg/dL 0.17 0.21 0.16   Albumin 3.7 - 4.7 g/dL 4.7 4.6 4.8 (H)   BUN 8 - 27 mg/dL 17 15 14   Creat 0.57 - 1.00 mg/dL 0.93 1.00 0.96   Na 134 - 144 mmol/L 138 133 (L) 137   K 3.5 - 5.2 mmol/L 4.5 4.9 4.3   Cl 96 - 106 mmol/L 98 93 (L) 97   CO2 20 - 29 mmol/L 25 25 26   Glucose 65 - 99 mg/dL 94 81 92   Magnesium 1.6 - 2.3 mg/dL  1.7      Liver Virology and Transplant Immune Suppression Tacrolimus Level   Latest Ref Rng & Units 2.0 - 20.0 ng/mL   6/14/2021 2.3   3/29/2021 7.0   3/5/2021 2.7   2/16/2021 1.2 (L)   1/18/2021 6.1     Laboratory testing from 6/14/2021 reviewed in detail. Additional testing included to evaluate progression or regression of disease. SEROLOGIES:  Serologies Latest Ref Rng & Units 8/17/2016   Hep B Surface Ag Negative Negative   Hep B Core Ab, Total Negative Negative   Hep B Surface AB QL  Non Reactive     Serologies Latest Ref Rng & Units 3/29/2021   Ferritin 15 - 150 ng/mL 69   Iron % Saturation 15 - 55 % 18     LIVER HISTOLOGY:  Not available or performed    ENDOSCOPIC PROCEDURES:  Not available or performed    RADIOLOGY:  Not available or performed    OTHER TESTING:  Not available or performed    FOLLOW-UP:  All of the issues listed above in the Assessment and Plan were discussed with the patient. All questions were answered. The patient expressed a clear understanding of the above. [facility] 87 in 3 months       April S.  ZUNILDA Gupta-[facility] of [address] [id number] [address] Alar[place], [address] [facility] 22.  [facility]

## 2021-10-19 NOTE — PROGRESS NOTES
Liver enzymes are normal, TAC level in therapeutic level. HGB has improved. Will discuss at the next office visit.

## 2021-10-26 NOTE — PROGRESS NOTES
Chief Complaint   Patient presents with    Liver Transplant Monitoring     f/u     1. Have you been to the ER, urgent care clinic since your last visit? Hospitalized since your last visit? No    2. Have you seen or consulted any other health care providers outside of the [address] [place] [address] since your last visit? Include any pap smears or colon screening.  No     Visit Vitals  /78 (BP 1 Location: Left arm, BP Patient Position: Sitting, BP Cuff Size: Adult)   Pulse 82   Temp (!) 96.7 °F (35.9 °C) (Temporal)   Resp 14   Ht 5' 3\" (1.6 m)   Wt 149 lb (67.6 kg)   SpO2 99%   BMI 26.39 kg/m²

## 2021-10-26 NOTE — PROGRESS NOTES
Mandi Esquivel MD, MD Amy Colon PA-C Marko Number, [facility]     April Efrain Osorio, PARDEEP Zamorano, PARDEEP Butcher, PARDEEP Herrera [facility] [address]    at [facility]    [address] [facility], [address] [place] [address] Rd, [address]    [phone]    FAX: [address] [place] [address], [address], [address]    [phone]    FAX: [phone]       Patient Care Team:  Marietta Newsome MD as PCP - General (Family Medicine)  Natali Hair MD (Family Medicine)  Srinivas Rogers MD (Cardiology)  Jeanice Goodell, RN as Care Coordinator (Hepatology)      Problem List  Date Reviewed: 7/27/2021        Codes Class Noted    Iron deficiency anemia secondary to inadequate dietary iron intake ICD-10-CM: D50.8  ICD-9-CM: 280.1  3/12/2021        Long term current use of immunosuppressive drug ICD-10-CM: Z79.899  ICD-9-CM: V58.69  3/27/2017        Liver transplant complication ([facility] MFV-06-HZ: T86.40  ICD-9-CM: 996.82  6/67/2809        Diastolic congestive heart failure ([facility] ICD-10-CM: I50.30  ICD-9-CM: 428.30, 428.0  9/29/2016        H/O liver transplant [facility]) ICD-10-CM: Z94.4  ICD-9-CM: V42.7  8/17/2016        S/P cataract surgery ICD-10-CM: Z98.49  ICD-9-CM: V45.61  8/17/2016        Osteoporosis ICD-10-CM: M81.0  ICD-9-CM: 733.00  8/17/2016        H/O cervical spine surgery ICD-10-CM: Z98.890  ICD-9-CM: V45.89  8/17/2016        Basal cell carcinoma ICD-10-CM: C44.91  ICD-9-CM: 173.91  8/17/2016    Overview Signed 8/17/2016 11:28 AM by Brando Kappa, MD     Multiple locations, face, back, neck, legs             S/P aortic valve replacement ICD-10-CM: Z95.2  ICD-9-CM: V43.3  8/17/2016        Pacemaker ICD-10-CM: Z95.0  ICD-9-CM: V45.01  8/17/2016        Essential tremor ICD-10-CM: G25.0  ICD-9-CM: 333.1  8/12/2013        Meningioma ([address] ICD-10-CM: D32.9  ICD-9-CM: 225.2  8/12/2013        Leg swelling ICD-10-CM: M79.89  ICD-9-CM: 729.81  Unknown        Atrial fib/flutter, transient ICD-9-CM: SOU3538  Unknown        Glossopharyngeal neuralgia ICD-10-CM: G52.1  ICD-9-CM: 352.1  10/24/2012              Ashley Aj returns to the [address] for management of liver graft function and immune suppression following a liver transplant. The active problem list, all pertinent past medical history, medications, radiologic findings and laboratory findings related to the liver disorder were reviewed with the patient. The patient is a [de-identified] y.o.  female who underwent a liver transplant in 2003 at [facility] for cirrhosis and hepatocellular carcinoma. Unknown cause of underlying liver disease that resulted in cirrhosis. She was followed closely at [facility] after the LT for 3 years, then moved to [facility] for 5 years. She has been in [place] since 2011. The patient had an episode of acute chest pain and was evaluated in the ER at [facility] in 2020. She had lower extremity edema, fluctuation of tacrolimus levels. This has now normalized. Since the last office visit the patient has been feeling well overall with no new medical complaints. She is staying very active with walking and chair Yoga. The patient reports feeling better than she had in several years. There are no symptoms of liver disease. She completes all daily activities without any functional limitations. She has not experienced swelling of the abdomen, problems with concentration or itching. ASSESSMENT AND PLAN:  Liver transplant   This was performed at [facility] in 2003. Have performed laboratory testing to monitor liver function and degree of liver injury.  This included BMP, hepatic panel, CBC with platelet count and INR. Laboratory testing from 10/12/2021 reviewed in detail. The liver transaminases, ALP, liver function and platelet count are normal.     There have been no episodes of graft rejection following transplant. Immune Suppression  Current dose of tacrolimus is 1 mg daily. Levels have been therapeutic ranging 2-7. Will continue at the current dose. Immuran is currently at 100 mg once daily. Will continue at this dose. Anemia  The HGB has been ranging 9-10 g/dl. This is most likely iron deficiency anemia. The levels are low normal. She is on oral iron supplementation. B12 and folate levels are normal.   She defers having a colonoscopy. PCP should order cologuard. Hepatocellular carcinoma  Incidental finding in the native liver at the time of transplant. Follow up screening is discontinued after 5 years post-transplant. No further testing required. Chronic kidney disease  Sr. Creatinine is normal.   NSAIDs should not be utilized as this may worsen CKD. Hypercholesterolemia   This is a common side effect of immune supression. She does not have hypercholesterolemia    Hypertension  This is a common side effect of immune suppression with calcineurin agents. She does not have hypertension at this time. Treatment of other medical problems in patients with chronic liver disease  There are no contraindications for the patient to take any medications that are necessary for treatment of other medical issues. The patient does not consume alcohol daily. Normal doses of acetaminophen can be used for pain as needed. Counseling for alcohol in patients with chronic liver disease  The patient was counseled regarding alcohol consumption and the effect of alcohol on chronic liver disease. The patient has normal liver function. She can consume an occasional alcoholic beverage. Multiple skin cancers   The patient has developed multiple skin cancers.  Pathology has been basal and squamous. Skin cancers are common after liver transplant due to chronic immune suppression. She is followed closely by dermatology. There have been areas of concern recently on the face, a topical ointment is being used to have these removed. Colonoscopy  The patient reports doing a Cologuard screening 2-3 years ago. She defers colonoscopy. Cologuard should be ordered by the PCP. Osteoporosis  The risk of osteoporosis is increased in following liver transplantation. DEXA bone density was completed 7/2019 which demonstrated osteopenia. Repeat testing will be due this year. Hypomagnesia  Low serum magnesium can be caused by immune suppression. Will check serum mag and provide oral supplement if low. Vaccinations   Routine vaccinations against other bacterial and viral agents can be performed as long as live vaccines are not utilizied. Annual flu vaccination should be administered if indicated. Allergies   Allergen Reactions    Codeine Rash    Demerol (Pf) [Meperidine (Pf)] Rash       Current Outpatient Medications   Medication Sig    tacrolimus (PROGRAF) 1 mg capsule TAKE 1 CAPSULE BY MOUTH DAILY. AVOID GRAPEFRUIT    gabapentin (NEURONTIN) 300 mg capsule Take 3 Capsules by mouth three (3) times daily. Max Daily Amount: 2,700 mg.    azaTHIOprine (IMURAN) 50 mg tablet TAKE 2 TABLETS BY MOUTH ONCE DAILY    POTASSIUM CITRATE PO Take 99 mg by mouth.  multivitamin (ONE A DAY) tablet Take 1 Tab by mouth daily.  bumetanide (BUMEX) 1 mg tablet Take  by mouth daily.  potassium chloride SR (KLOR-CON 10) 10 mEq tablet Take 90 mEq by mouth daily.  CALCIUM CARBONATE/MAG CARB/FA (MAGNESIUM-CALCIUM-FOLIC ACID PO) Take  by mouth.  spironolactone (ALDACTONE) 25 mg tablet Take  by mouth daily.  ALIPOIC ACID/BIOFLAV/MV/GR TEA (ULTRA SAMUEL PO) Take  by mouth.  Omeprazole delayed release (PRILOSEC D/R) 20 mg tablet Take 20 mg by mouth two (2) times a day.       WARFARIN SODIUM (WARFARIN PO) Take  by mouth. 1mg/2mg/5mg     bacitracin zinc (BACITRACIN) ointment Apply to incision as directed 3x/day (Patient not taking: Reported on 10/26/2021)     No current facility-administered medications for this visit. SYSTEM REVIEW NOT RELATED TO LIVER DISEASE OR REVIEWED ABOVE:  Constitution systems: Negative for fever, chills, weight gain, weight loss. Eyes: Negative for visual changes. ENT: Negative for sore throat, painful swallowing. Respiratory: Negative for cough, hemoptysis, SOB. Cardiology: Negative for chest pain, palpitations. H/O heart valve surgery. GI:  Negative for constipation or diarrhea. : Negative for urinary frequency, dysuria, hematuria, nocturia. Skin: Negative for rash. H/O skin cancer  Hematology: Negative for easy bruising, blood clots. Musculo-skelatal: Negative for back pain, muscle pain, weakness. Neurologic: Negative for headaches, dizziness, vertigo, memory problems not related to HE. Psychology: Negative for anxiety, depression. FAMILY HISTORY:  There is no family history of liver disease  There is no family history of heart disease    SOCIAL HISTORY:  The patient is . The patient has 3 children, 3 stepchildren, and 10 grandchildren, 1 great grandchild. The patient has never used tobacco products. The patient has never consumed significant amounts of alcohol. The patient used to work as in many different jobs. PHYSICAL EXAMINATION:  Visit Vitals  /78 (BP 1 Location: Left arm, BP Patient Position: Sitting, BP Cuff Size: Adult)   Pulse 82   Temp (!) 96.7 °F (35.9 °C) (Temporal)   Resp 14   Ht 5' 3\" (1.6 m)   Wt 149 lb (67.6 kg)   SpO2 99%   BMI 26.39 kg/m²       General: No acute distress. Eyes: Sclera anicteric. ENT: No oral lesions. Thyroid normal.  Nodes: No adenopathy. Skin: No spider angiomata. No jaundice. No palmar erythema. Respiratory: Lungs clear to auscultation.    Cardiovascular: Regular heart rate.  No murmurs. No JVD. Abdomen: Soft non-tender, liver size normal to percussion/palpation. Spleen not palpable. No obvious ascites. Extremities: No edema. No muscle wasting. No gross arthritic changes. Neurologic: Alert and oriented. Cranial nerves grossly intact. No asterixis. LABORATORY STUDIES:  [address] Units 10/12/2021 6/14/2021   WBC 3.4 - 10.8 x10E3/uL 6.6    ANC 1.4 - 7.0 x10E3/uL 4.4    HGB 11.1 - 15.9 g/dL 10.3 (L)     - 450 x10E3/uL 204    INR 0.9 - 1.2 1.7 (H)    AST 0 - 40 IU/L 22 23   ALT 0 - 32 IU/L 14 14   Alk Phos 44 - 121 IU/L 116 106   Bili, Total 0.0 - 1.2 mg/dL 0.7 0.5   Bili, Direct 0.00 - 0.40 mg/dL 0.18 0.17   Albumin 3.7 - 4.7 g/dL 4.8 (H) 4.7   BUN 8 - 27 mg/dL 12 17   Creat 0.57 - 1.00 mg/dL 0.92 0.93   Na 134 - 144 mmol/L 139 138   K 3.5 - 5.2 mmol/L 4.3 4.5   Cl 96 - 106 mmol/L 96 98   CO2 20 - 29 mmol/L 29 25   Glucose 65 - 99 mg/dL 112 (H) 94   Magnesium 1.6 - 2.3 mg/dL 1.9      [facility] Latest Ref Rng & Units 3/29/2021   WBC 3.4 - 10.8 x10E3/uL 5.5   ANC 1.4 - 7.0 x10E3/uL 3.9   HGB 11.1 - 15.9 g/dL 8.9 (L)    - 450 x10E3/uL 193   INR 0.9 - 1.2 1.4 (H)   AST 0 - 40 IU/L 25   ALT 0 - 32 IU/L 12   Alk Phos 44 - 121 IU/L 97   Bili, Total 0.0 - 1.2 mg/dL 0.6   Bili, Direct 0.00 - 0.40 mg/dL 0.21   Albumin 3.7 - 4.7 g/dL 4.6   BUN 8 - 27 mg/dL 15   Creat 0.57 - 1.00 mg/dL 1.00   Na 134 - 144 mmol/L 133 (L)   K 3.5 - 5.2 mmol/L 4.9   Cl 96 - 106 mmol/L 93 (L)   CO2 20 - 29 mmol/L 25   Glucose 65 - 99 mg/dL 81   Magnesium 1.6 - 2.3 mg/dL 1.7     Liver Virology and Transplant Immune Suppression Tacrolimus Level   Latest Ref Rng & Units 2.0 - 20.0 ng/mL   10/12/2021 3.6   6/14/2021 2.3   3/29/2021 7.0   3/5/2021 2.7     Laboratory testing from 10/12/2021 reviewed in detail. Additional testing included to evaluate progression or regression of disease.  Laboratory testing results from today will be communicated by My Chart. SEROLOGIES:  [facility]ologies Latest Ref Rng & Units 8/17/2016   Hep B Surface Ag Negative Negative   Hep B Core Ab, Total Negative Negative   Hep B Surface AB QL  Non Reactive     Serologies Latest Ref Rng & Units 3/29/2021   Ferritin 15 - 150 ng/mL 69   Iron % Saturation 15 - 55 % 18     LIVER HISTOLOGY:  Not available or performed    ENDOSCOPIC PROCEDURES:  Not available or performed    RADIOLOGY:  Not available or performed    OTHER TESTING:  Not available or performed    FOLLOW-UP:  All of the issues listed above in the Assessment and Plan were discussed with the patient. All questions were answered. The patient expressed a clear understanding of the above. [address] [id number] in 3 months for routine monitoring. April JAN OrozcoNP-BC  [facility] [address] of [address] [address] [address], [address] [place] [address] 22.  [address] [facility]

## 2021-11-08 NOTE — TELEPHONE ENCOUNTER
Patient left a message saying she needs a new prescription for azathioprine and she'd like it to go to [address]. She has 6 days left. She asked for a call back to confirm prescription is sent.

## 2021-11-08 NOTE — TELEPHONE ENCOUNTER
E-scribed refill of Azothiaprine as ordered by Vanessa Gupta. Called patient to let her know the refill was sent in. Reviewed that next appt scheduled for February 15th @ 11:40am, send patient lab requisitions to get labwork completed before appointment. Patient without complaints or other requests, she verbalized understanding of instructions.

## 2021-12-13 NOTE — TELEPHONE ENCOUNTER
Temple and behind eye, sharp pain  Down into face, jaw and teeth right side  Offered to get scheduled in feb 2

## 2022-01-01 ENCOUNTER — TELEPHONE (OUTPATIENT)
Dept: HEMATOLOGY | Age: 82
End: 2022-01-01

## 2022-01-01 ENCOUNTER — VIRTUAL VISIT (OUTPATIENT)
Dept: NEUROLOGY | Age: 82
End: 2022-01-01
Payer: MEDICARE

## 2022-01-01 ENCOUNTER — OFFICE VISIT (OUTPATIENT)
Dept: HEMATOLOGY | Age: 82
End: 2022-01-01
Payer: MEDICARE

## 2022-01-01 ENCOUNTER — DOCUMENTATION ONLY (OUTPATIENT)
Dept: NEUROLOGY | Age: 82
End: 2022-01-01

## 2022-01-01 VITALS
WEIGHT: 144 LBS | TEMPERATURE: 97 F | BODY MASS INDEX: 25.52 KG/M2 | HEART RATE: 74 BPM | HEIGHT: 63 IN | DIASTOLIC BLOOD PRESSURE: 80 MMHG | SYSTOLIC BLOOD PRESSURE: 153 MMHG

## 2022-01-01 VITALS
TEMPERATURE: 97.5 F | HEART RATE: 70 BPM | DIASTOLIC BLOOD PRESSURE: 56 MMHG | BODY MASS INDEX: 26.58 KG/M2 | SYSTOLIC BLOOD PRESSURE: 129 MMHG | WEIGHT: 150 LBS | HEIGHT: 63 IN

## 2022-01-01 VITALS
DIASTOLIC BLOOD PRESSURE: 71 MMHG | WEIGHT: 148 LBS | HEART RATE: 73 BPM | SYSTOLIC BLOOD PRESSURE: 148 MMHG | HEIGHT: 63 IN | OXYGEN SATURATION: 93 % | TEMPERATURE: 98.6 F | BODY MASS INDEX: 26.22 KG/M2

## 2022-01-01 DIAGNOSIS — Z79.899 LONG TERM CURRENT USE OF IMMUNOSUPPRESSIVE DRUG: ICD-10-CM

## 2022-01-01 DIAGNOSIS — Z94.4 H/O LIVER TRANSPLANT (HCC): ICD-10-CM

## 2022-01-01 DIAGNOSIS — R06.02 SHORTNESS OF BREATH: ICD-10-CM

## 2022-01-01 DIAGNOSIS — D50.9 IRON DEFICIENCY ANEMIA, UNSPECIFIED IRON DEFICIENCY ANEMIA TYPE: ICD-10-CM

## 2022-01-01 DIAGNOSIS — Z94.4 H/O LIVER TRANSPLANT (HCC): Primary | ICD-10-CM

## 2022-01-01 DIAGNOSIS — R19.7 DIARRHEA OF PRESUMED INFECTIOUS ORIGIN: ICD-10-CM

## 2022-01-01 DIAGNOSIS — C44.92 SQUAMOUS CELL SKIN CANCER: ICD-10-CM

## 2022-01-01 DIAGNOSIS — C44.92 SQUAMOUS CELL SKIN CANCER: Primary | ICD-10-CM

## 2022-01-01 DIAGNOSIS — G52.1 GLOSSOPHARYNGEAL NEURALGIA: ICD-10-CM

## 2022-01-01 DIAGNOSIS — C44.91 BASAL CELL CARCINOMA (BCC), UNSPECIFIED SITE: ICD-10-CM

## 2022-01-01 DIAGNOSIS — G25.0 ESSENTIAL TREMOR: Primary | ICD-10-CM

## 2022-01-01 LAB
ALBUMIN SERPL-MCNC: 3.8 G/DL (ref 3.7–4.7)
ALBUMIN SERPL-MCNC: 3.9 G/DL (ref 3.7–4.7)
ALBUMIN SERPL-MCNC: 4.1 G/DL (ref 3.7–4.7)
ALBUMIN SERPL-MCNC: 4.2 G/DL (ref 3.7–4.7)
ALBUMIN SERPL-MCNC: 4.4 G/DL (ref 3.7–4.7)
ALBUMIN SERPL-MCNC: 4.5 G/DL (ref 3.6–4.6)
ALBUMIN SERPL-MCNC: 4.5 G/DL (ref 3.7–4.7)
ALBUMIN SERPL-MCNC: 4.6 G/DL (ref 3.7–4.7)
ALBUMIN/GLOB SERPL: 2 {RATIO} (ref 1.2–2.2)
ALBUMIN/GLOB SERPL: 2.1 {RATIO} (ref 1.2–2.2)
ALBUMIN/GLOB SERPL: 2.4 {RATIO} (ref 1.2–2.2)
ALBUMIN/GLOB SERPL: 2.4 {RATIO} (ref 1.2–2.2)
ALP SERPL-CCNC: 126 IU/L (ref 44–121)
ALP SERPL-CCNC: 152 IU/L (ref 44–121)
ALP SERPL-CCNC: 69 IU/L (ref 44–121)
ALP SERPL-CCNC: 74 IU/L (ref 44–121)
ALP SERPL-CCNC: 84 IU/L (ref 44–121)
ALP SERPL-CCNC: 84 IU/L (ref 44–121)
ALP SERPL-CCNC: 88 IU/L (ref 44–121)
ALP SERPL-CCNC: 91 IU/L (ref 44–121)
ALT SERPL-CCNC: 10 IU/L (ref 0–32)
ALT SERPL-CCNC: 12 IU/L (ref 0–32)
ALT SERPL-CCNC: 14 IU/L (ref 0–32)
ALT SERPL-CCNC: 14 IU/L (ref 0–32)
ALT SERPL-CCNC: 22 IU/L (ref 0–32)
ALT SERPL-CCNC: 23 IU/L (ref 0–32)
ALT SERPL-CCNC: 26 IU/L (ref 0–32)
ALT SERPL-CCNC: 28 IU/L (ref 0–32)
AST SERPL-CCNC: 12 IU/L (ref 0–40)
AST SERPL-CCNC: 15 IU/L (ref 0–40)
AST SERPL-CCNC: 16 IU/L (ref 0–40)
AST SERPL-CCNC: 17 IU/L (ref 0–40)
AST SERPL-CCNC: 18 IU/L (ref 0–40)
AST SERPL-CCNC: 18 IU/L (ref 0–40)
AST SERPL-CCNC: 21 IU/L (ref 0–40)
AST SERPL-CCNC: 27 IU/L (ref 0–40)
BASOPHILS # BLD AUTO: 0 X10E3/UL (ref 0–0.2)
BASOPHILS NFR BLD AUTO: 0 %
BILIRUB DIRECT SERPL-MCNC: 0.14 MG/DL (ref 0–0.4)
BILIRUB DIRECT SERPL-MCNC: 0.21 MG/DL (ref 0–0.4)
BILIRUB DIRECT SERPL-MCNC: 0.23 MG/DL (ref 0–0.4)
BILIRUB DIRECT SERPL-MCNC: 0.24 MG/DL (ref 0–0.4)
BILIRUB SERPL-MCNC: 0.4 MG/DL (ref 0–1.2)
BILIRUB SERPL-MCNC: 0.5 MG/DL (ref 0–1.2)
BILIRUB SERPL-MCNC: 0.5 MG/DL (ref 0–1.2)
BILIRUB SERPL-MCNC: 0.6 MG/DL (ref 0–1.2)
BILIRUB SERPL-MCNC: 0.6 MG/DL (ref 0–1.2)
BILIRUB SERPL-MCNC: 0.7 MG/DL (ref 0–1.2)
BUN SERPL-MCNC: 10 MG/DL (ref 8–27)
BUN SERPL-MCNC: 13 MG/DL (ref 8–27)
BUN SERPL-MCNC: 13 MG/DL (ref 8–27)
BUN SERPL-MCNC: 14 MG/DL (ref 8–27)
BUN SERPL-MCNC: 14 MG/DL (ref 8–27)
BUN SERPL-MCNC: 16 MG/DL (ref 8–27)
BUN SERPL-MCNC: 25 MG/DL (ref 8–27)
BUN SERPL-MCNC: 25 MG/DL (ref 8–27)
BUN/CREAT SERPL: 12 (ref 12–28)
BUN/CREAT SERPL: 14 (ref 12–28)
BUN/CREAT SERPL: 15 (ref 12–28)
BUN/CREAT SERPL: 16 (ref 12–28)
BUN/CREAT SERPL: 17 (ref 12–28)
BUN/CREAT SERPL: 17 (ref 12–28)
BUN/CREAT SERPL: 25 (ref 12–28)
BUN/CREAT SERPL: 27 (ref 12–28)
C DIFF TOX A+B STL QL IA: NEGATIVE
CALCIUM SERPL-MCNC: 8.7 MG/DL (ref 8.7–10.3)
CALCIUM SERPL-MCNC: 8.8 MG/DL (ref 8.7–10.3)
CALCIUM SERPL-MCNC: 9 MG/DL (ref 8.7–10.3)
CALCIUM SERPL-MCNC: 9.3 MG/DL (ref 8.7–10.3)
CALCIUM SERPL-MCNC: 9.4 MG/DL (ref 8.7–10.3)
CALCIUM SERPL-MCNC: 9.6 MG/DL (ref 8.7–10.3)
CHLORIDE SERPL-SCNC: 87 MMOL/L (ref 96–106)
CHLORIDE SERPL-SCNC: 89 MMOL/L (ref 96–106)
CHLORIDE SERPL-SCNC: 91 MMOL/L (ref 96–106)
CHLORIDE SERPL-SCNC: 92 MMOL/L (ref 96–106)
CHLORIDE SERPL-SCNC: 92 MMOL/L (ref 96–106)
CHLORIDE SERPL-SCNC: 93 MMOL/L (ref 96–106)
CHLORIDE SERPL-SCNC: 94 MMOL/L (ref 96–106)
CHLORIDE SERPL-SCNC: 99 MMOL/L (ref 96–106)
CHOLEST SERPL-MCNC: 166 MG/DL (ref 100–199)
CHOLEST SERPL-MCNC: 189 MG/DL (ref 100–199)
CHOLEST SERPL-MCNC: 206 MG/DL (ref 100–199)
CHOLEST SERPL-MCNC: 230 MG/DL (ref 100–199)
CO2 SERPL-SCNC: 24 MMOL/L (ref 20–29)
CO2 SERPL-SCNC: 25 MMOL/L (ref 20–29)
CO2 SERPL-SCNC: 26 MMOL/L (ref 20–29)
CO2 SERPL-SCNC: 27 MMOL/L (ref 20–29)
CO2 SERPL-SCNC: 29 MMOL/L (ref 20–29)
CO2 SERPL-SCNC: 29 MMOL/L (ref 20–29)
CREAT SERPL-MCNC: 0.81 MG/DL (ref 0.57–1)
CREAT SERPL-MCNC: 0.84 MG/DL (ref 0.57–1)
CREAT SERPL-MCNC: 0.89 MG/DL (ref 0.57–1)
CREAT SERPL-MCNC: 0.9 MG/DL (ref 0.57–1)
CREAT SERPL-MCNC: 0.9 MG/DL (ref 0.57–1)
CREAT SERPL-MCNC: 0.92 MG/DL (ref 0.57–1)
CREAT SERPL-MCNC: 0.96 MG/DL (ref 0.57–1)
CREAT SERPL-MCNC: 0.99 MG/DL (ref 0.57–1)
EGFR: 58 ML/MIN/1.73
EGFR: 59 ML/MIN/1.73
EGFR: 63 ML/MIN/1.73
EGFR: 65 ML/MIN/1.73
EGFR: 65 ML/MIN/1.73
EGFR: 70 ML/MIN/1.73
EGFR: 73 ML/MIN/1.73
EOSINOPHIL # BLD AUTO: 0 X10E3/UL (ref 0–0.4)
EOSINOPHIL # BLD AUTO: 0.1 X10E3/UL (ref 0–0.4)
EOSINOPHIL # BLD AUTO: 0.2 X10E3/UL (ref 0–0.4)
EOSINOPHIL NFR BLD AUTO: 0 %
EOSINOPHIL NFR BLD AUTO: 1 %
EOSINOPHIL NFR BLD AUTO: 1 %
EOSINOPHIL NFR BLD AUTO: 2 %
ERYTHROCYTE [DISTWIDTH] IN BLOOD BY AUTOMATED COUNT: 14.3 % (ref 11.7–15.4)
ERYTHROCYTE [DISTWIDTH] IN BLOOD BY AUTOMATED COUNT: 14.4 % (ref 11.7–15.4)
ERYTHROCYTE [DISTWIDTH] IN BLOOD BY AUTOMATED COUNT: 14.9 % (ref 11.7–15.4)
ERYTHROCYTE [DISTWIDTH] IN BLOOD BY AUTOMATED COUNT: 15.1 % (ref 11.7–15.4)
ERYTHROCYTE [DISTWIDTH] IN BLOOD BY AUTOMATED COUNT: 15.5 % (ref 11.7–15.4)
ERYTHROCYTE [DISTWIDTH] IN BLOOD BY AUTOMATED COUNT: 15.6 % (ref 11.7–15.4)
ERYTHROCYTE [DISTWIDTH] IN BLOOD BY AUTOMATED COUNT: 17.1 % (ref 11.7–15.4)
ERYTHROCYTE [DISTWIDTH] IN BLOOD BY AUTOMATED COUNT: 17.9 % (ref 11.7–15.4)
EVEROLIMUS BLD-MCNC: 2.3 NG/ML (ref 3–8)
EVEROLIMUS BLD-MCNC: 4 NG/ML (ref 3–8)
EVEROLIMUS BLD-MCNC: 5.8 NG/ML (ref 3–8)
EVEROLIMUS BLD-MCNC: 8.9 NG/ML (ref 3–8)
EVEROLIMUS BLD-MCNC: <0.5 NG/ML (ref 3–8)
GLOBULIN SER CALC-MCNC: 1.6 G/DL (ref 1.5–4.5)
GLOBULIN SER CALC-MCNC: 1.7 G/DL (ref 1.5–4.5)
GLOBULIN SER CALC-MCNC: 2.1 G/DL (ref 1.5–4.5)
GLOBULIN SER CALC-MCNC: 2.2 G/DL (ref 1.5–4.5)
GLUCOSE SERPL-MCNC: 102 MG/DL (ref 65–99)
GLUCOSE SERPL-MCNC: 120 MG/DL (ref 65–99)
GLUCOSE SERPL-MCNC: 129 MG/DL (ref 65–99)
GLUCOSE SERPL-MCNC: 152 MG/DL (ref 65–99)
GLUCOSE SERPL-MCNC: 156 MG/DL (ref 65–99)
GLUCOSE SERPL-MCNC: 221 MG/DL (ref 65–99)
GLUCOSE SERPL-MCNC: 86 MG/DL (ref 65–99)
GLUCOSE SERPL-MCNC: 93 MG/DL (ref 65–99)
HCT VFR BLD AUTO: 24 % (ref 34–46.6)
HCT VFR BLD AUTO: 24.6 % (ref 34–46.6)
HCT VFR BLD AUTO: 25.1 % (ref 34–46.6)
HCT VFR BLD AUTO: 27 % (ref 34–46.6)
HCT VFR BLD AUTO: 27.9 % (ref 34–46.6)
HCT VFR BLD AUTO: 28.9 % (ref 34–46.6)
HCT VFR BLD AUTO: 31.6 % (ref 34–46.6)
HCT VFR BLD AUTO: 33.6 % (ref 34–46.6)
HDLC SERPL-MCNC: 57 MG/DL
HDLC SERPL-MCNC: 76 MG/DL
HDLC SERPL-MCNC: 87 MG/DL
HDLC SERPL-MCNC: 94 MG/DL
HGB BLD-MCNC: 10.9 G/DL (ref 11.1–15.9)
HGB BLD-MCNC: 11.3 G/DL (ref 11.1–15.9)
HGB BLD-MCNC: 7.8 G/DL (ref 11.1–15.9)
HGB BLD-MCNC: 7.9 G/DL (ref 11.1–15.9)
HGB BLD-MCNC: 8.4 G/DL (ref 11.1–15.9)
HGB BLD-MCNC: 8.6 G/DL (ref 11.1–15.9)
HGB BLD-MCNC: 9.2 G/DL (ref 11.1–15.9)
HGB BLD-MCNC: 9.9 G/DL (ref 11.1–15.9)
IMM GRANULOCYTES # BLD AUTO: 0 X10E3/UL (ref 0–0.1)
IMM GRANULOCYTES # BLD AUTO: 0 X10E3/UL (ref 0–0.1)
IMM GRANULOCYTES # BLD AUTO: 0.1 X10E3/UL (ref 0–0.1)
IMM GRANULOCYTES # BLD AUTO: 0.4 X10E3/UL (ref 0–0.1)
IMM GRANULOCYTES NFR BLD AUTO: 1 %
IMM GRANULOCYTES NFR BLD AUTO: 5 %
INR PPP: 1 (ref 0.9–1.2)
INR PPP: 1.1 (ref 0.9–1.2)
INR PPP: 1.9 (ref 0.9–1.2)
LDLC SERPL CALC-MCNC: 110 MG/DL (ref 0–99)
LDLC SERPL CALC-MCNC: 114 MG/DL (ref 0–99)
LDLC SERPL CALC-MCNC: 79 MG/DL (ref 0–99)
LDLC SERPL CALC-MCNC: 88 MG/DL (ref 0–99)
LYMPHOCYTES # BLD AUTO: 0.6 X10E3/UL (ref 0.7–3.1)
LYMPHOCYTES # BLD AUTO: 0.7 X10E3/UL (ref 0.7–3.1)
LYMPHOCYTES # BLD AUTO: 0.8 X10E3/UL (ref 0.7–3.1)
LYMPHOCYTES # BLD AUTO: 1 X10E3/UL (ref 0.7–3.1)
LYMPHOCYTES # BLD AUTO: 1 X10E3/UL (ref 0.7–3.1)
LYMPHOCYTES # BLD AUTO: 1.1 X10E3/UL (ref 0.7–3.1)
LYMPHOCYTES # BLD AUTO: 1.3 X10E3/UL (ref 0.7–3.1)
LYMPHOCYTES NFR BLD AUTO: 11 %
LYMPHOCYTES NFR BLD AUTO: 11 %
LYMPHOCYTES NFR BLD AUTO: 13 %
LYMPHOCYTES NFR BLD AUTO: 14 %
LYMPHOCYTES NFR BLD AUTO: 6 %
LYMPHOCYTES NFR BLD AUTO: 8 %
LYMPHOCYTES NFR BLD AUTO: 9 %
MAGNESIUM SERPL-MCNC: 1.4 MG/DL (ref 1.6–2.3)
MAGNESIUM SERPL-MCNC: 1.5 MG/DL (ref 1.6–2.3)
MAGNESIUM SERPL-MCNC: 1.6 MG/DL (ref 1.6–2.3)
MAGNESIUM SERPL-MCNC: 1.7 MG/DL (ref 1.6–2.3)
MAGNESIUM SERPL-MCNC: 1.8 MG/DL (ref 1.6–2.3)
MAGNESIUM SERPL-MCNC: 1.8 MG/DL (ref 1.6–2.3)
MAGNESIUM SERPL-MCNC: 1.9 MG/DL (ref 1.6–2.3)
MAGNESIUM SERPL-MCNC: 2 MG/DL (ref 1.6–2.3)
MCH RBC QN AUTO: 27.8 PG (ref 26.6–33)
MCH RBC QN AUTO: 29.3 PG (ref 26.6–33)
MCH RBC QN AUTO: 29.4 PG (ref 26.6–33)
MCH RBC QN AUTO: 30.8 PG (ref 26.6–33)
MCH RBC QN AUTO: 31.7 PG (ref 26.6–33)
MCH RBC QN AUTO: 31.9 PG (ref 26.6–33)
MCH RBC QN AUTO: 32.6 PG (ref 26.6–33)
MCH RBC QN AUTO: 32.8 PG (ref 26.6–33)
MCHC RBC AUTO-ENTMCNC: 31.1 G/DL (ref 31.5–35.7)
MCHC RBC AUTO-ENTMCNC: 31.9 G/DL (ref 31.5–35.7)
MCHC RBC AUTO-ENTMCNC: 32.9 G/DL (ref 31.5–35.7)
MCHC RBC AUTO-ENTMCNC: 33 G/DL (ref 31.5–35.7)
MCHC RBC AUTO-ENTMCNC: 33.6 G/DL (ref 31.5–35.7)
MCHC RBC AUTO-ENTMCNC: 34.1 G/DL (ref 31.5–35.7)
MCHC RBC AUTO-ENTMCNC: 34.3 G/DL (ref 31.5–35.7)
MCHC RBC AUTO-ENTMCNC: 34.5 G/DL (ref 31.5–35.7)
MCV RBC AUTO: 87 FL (ref 79–97)
MCV RBC AUTO: 89 FL (ref 79–97)
MCV RBC AUTO: 93 FL (ref 79–97)
MCV RBC AUTO: 94 FL (ref 79–97)
MCV RBC AUTO: 95 FL (ref 79–97)
MCV RBC AUTO: 97 FL (ref 79–97)
MONOCYTES # BLD AUTO: 0.2 X10E3/UL (ref 0.1–0.9)
MONOCYTES # BLD AUTO: 0.5 X10E3/UL (ref 0.1–0.9)
MONOCYTES # BLD AUTO: 0.5 X10E3/UL (ref 0.1–0.9)
MONOCYTES # BLD AUTO: 0.7 X10E3/UL (ref 0.1–0.9)
MONOCYTES # BLD AUTO: 0.7 X10E3/UL (ref 0.1–0.9)
MONOCYTES # BLD AUTO: 0.8 X10E3/UL (ref 0.1–0.9)
MONOCYTES # BLD AUTO: 0.8 X10E3/UL (ref 0.1–0.9)
MONOCYTES NFR BLD AUTO: 11 %
MONOCYTES NFR BLD AUTO: 2 %
MONOCYTES NFR BLD AUTO: 6 %
MONOCYTES NFR BLD AUTO: 6 %
MONOCYTES NFR BLD AUTO: 7 %
MONOCYTES NFR BLD AUTO: 9 %
MONOCYTES NFR BLD AUTO: 9 %
NEUTROPHILS # BLD AUTO: 10.3 X10E3/UL (ref 1.4–7)
NEUTROPHILS # BLD AUTO: 4.7 X10E3/UL (ref 1.4–7)
NEUTROPHILS # BLD AUTO: 5.1 X10E3/UL (ref 1.4–7)
NEUTROPHILS # BLD AUTO: 6.6 X10E3/UL (ref 1.4–7)
NEUTROPHILS # BLD AUTO: 6.9 X10E3/UL (ref 1.4–7)
NEUTROPHILS # BLD AUTO: 8.8 X10E3/UL (ref 1.4–7)
NEUTROPHILS # BLD AUTO: 9.8 X10E3/UL (ref 1.4–7)
NEUTROPHILS NFR BLD AUTO: 73 %
NEUTROPHILS NFR BLD AUTO: 75 %
NEUTROPHILS NFR BLD AUTO: 76 %
NEUTROPHILS NFR BLD AUTO: 82 %
NEUTROPHILS NFR BLD AUTO: 82 %
NEUTROPHILS NFR BLD AUTO: 84 %
NEUTROPHILS NFR BLD AUTO: 91 %
PLATELET # BLD AUTO: 124 X10E3/UL (ref 150–450)
PLATELET # BLD AUTO: 129 X10E3/UL (ref 150–450)
PLATELET # BLD AUTO: 164 X10E3/UL (ref 150–450)
PLATELET # BLD AUTO: 173 X10E3/UL (ref 150–450)
PLATELET # BLD AUTO: 182 X10E3/UL (ref 150–450)
PLATELET # BLD AUTO: 225 X10E3/UL (ref 150–450)
PLATELET # BLD AUTO: 232 X10E3/UL (ref 150–450)
PLATELET # BLD AUTO: 292 X10E3/UL (ref 150–450)
POTASSIUM SERPL-SCNC: 3.1 MMOL/L (ref 3.5–5.2)
POTASSIUM SERPL-SCNC: 3.8 MMOL/L (ref 3.5–5.2)
POTASSIUM SERPL-SCNC: 3.9 MMOL/L (ref 3.5–5.2)
POTASSIUM SERPL-SCNC: 3.9 MMOL/L (ref 3.5–5.2)
POTASSIUM SERPL-SCNC: 4 MMOL/L (ref 3.5–5.2)
POTASSIUM SERPL-SCNC: 4.2 MMOL/L (ref 3.5–5.2)
POTASSIUM SERPL-SCNC: 4.3 MMOL/L (ref 3.5–5.2)
POTASSIUM SERPL-SCNC: 4.3 MMOL/L (ref 3.5–5.2)
PROT SERPL-MCNC: 5.5 G/DL (ref 6–8.5)
PROT SERPL-MCNC: 5.7 G/DL (ref 6–8.5)
PROT SERPL-MCNC: 5.8 G/DL (ref 6–8.5)
PROT SERPL-MCNC: 6.2 G/DL (ref 6–8.5)
PROT SERPL-MCNC: 6.4 G/DL (ref 6–8.5)
PROT SERPL-MCNC: 6.5 G/DL (ref 6–8.5)
PROT SERPL-MCNC: 6.7 G/DL (ref 6–8.5)
PROT SERPL-MCNC: 6.7 G/DL (ref 6–8.5)
PROTHROMBIN TIME: 10.9 SEC (ref 9.1–12)
PROTHROMBIN TIME: 11.9 SEC (ref 9.1–12)
PROTHROMBIN TIME: 19.8 SEC (ref 9.1–12)
RBC # BLD AUTO: 2.65 X10E6/UL (ref 3.77–5.28)
RBC # BLD AUTO: 2.66 X10E6/UL (ref 3.77–5.28)
RBC # BLD AUTO: 2.69 X10E6/UL (ref 3.77–5.28)
RBC # BLD AUTO: 2.99 X10E6/UL (ref 3.77–5.28)
RBC # BLD AUTO: 3.09 X10E6/UL (ref 3.77–5.28)
RBC # BLD AUTO: 3.1 X10E6/UL (ref 3.77–5.28)
RBC # BLD AUTO: 3.34 X10E6/UL (ref 3.77–5.28)
RBC # BLD AUTO: 3.45 X10E6/UL (ref 3.77–5.28)
SODIUM SERPL-SCNC: 133 MMOL/L (ref 134–144)
SODIUM SERPL-SCNC: 134 MMOL/L (ref 134–144)
SODIUM SERPL-SCNC: 136 MMOL/L (ref 134–144)
SODIUM SERPL-SCNC: 136 MMOL/L (ref 134–144)
SODIUM SERPL-SCNC: 142 MMOL/L (ref 134–144)
SODIUM SERPL-SCNC: 143 MMOL/L (ref 134–144)
SPECIMEN STATUS REPORT, ROLRST: NORMAL
SPECIMEN STATUS REPORT, ROLRST: NORMAL
TACROLIMUS, 700249: 0.9 NG/ML (ref 2–20)
TACROLIMUS, 700249: 1.9 NG/ML (ref 2–20)
TACROLIMUS, 700249: 2.6 NG/ML (ref 2–20)
TACROLIMUS, 700249: 2.8 NG/ML (ref 2–20)
TRIGL SERPL-MCNC: 115 MG/DL (ref 0–149)
TRIGL SERPL-MCNC: 140 MG/DL (ref 0–149)
TRIGL SERPL-MCNC: 153 MG/DL (ref 0–149)
TRIGL SERPL-MCNC: 88 MG/DL (ref 0–149)
VLDLC SERPL CALC-MCNC: 16 MG/DL (ref 5–40)
VLDLC SERPL CALC-MCNC: 21 MG/DL (ref 5–40)
VLDLC SERPL CALC-MCNC: 23 MG/DL (ref 5–40)
VLDLC SERPL CALC-MCNC: 26 MG/DL (ref 5–40)
WBC # BLD AUTO: 11.8 X10E3/UL (ref 3.4–10.8)
WBC # BLD AUTO: 12.5 X10E3/UL (ref 3.4–10.8)
WBC # BLD AUTO: 4.6 X10E3/UL (ref 3.4–10.8)
WBC # BLD AUTO: 6.2 X10E3/UL (ref 3.4–10.8)
WBC # BLD AUTO: 6.9 X10E3/UL (ref 3.4–10.8)
WBC # BLD AUTO: 8.2 X10E3/UL (ref 3.4–10.8)
WBC # BLD AUTO: 8.8 X10E3/UL (ref 3.4–10.8)
WBC # BLD AUTO: 9.6 X10E3/UL (ref 3.4–10.8)

## 2022-01-01 PROCEDURE — G8427 DOCREV CUR MEDS BY ELIG CLIN: HCPCS | Performed by: NURSE PRACTITIONER

## 2022-01-01 PROCEDURE — 99214 OFFICE O/P EST MOD 30 MIN: CPT | Performed by: NURSE PRACTITIONER

## 2022-01-01 PROCEDURE — 99215 OFFICE O/P EST HI 40 MIN: CPT | Performed by: NURSE PRACTITIONER

## 2022-01-01 PROCEDURE — 1101F PT FALLS ASSESS-DOCD LE1/YR: CPT | Performed by: NURSE PRACTITIONER

## 2022-01-01 PROCEDURE — G8432 DEP SCR NOT DOC, RNG: HCPCS | Performed by: NURSE PRACTITIONER

## 2022-01-01 PROCEDURE — G8536 NO DOC ELDER MAL SCRN: HCPCS | Performed by: NURSE PRACTITIONER

## 2022-01-01 PROCEDURE — 1090F PRES/ABSN URINE INCON ASSESS: CPT | Performed by: NURSE PRACTITIONER

## 2022-01-01 PROCEDURE — 1123F ACP DISCUSS/DSCN MKR DOCD: CPT | Performed by: NURSE PRACTITIONER

## 2022-01-01 PROCEDURE — G8417 CALC BMI ABV UP PARAM F/U: HCPCS | Performed by: NURSE PRACTITIONER

## 2022-01-01 PROCEDURE — G8419 CALC BMI OUT NRM PARAM NOF/U: HCPCS | Performed by: NURSE PRACTITIONER

## 2022-01-01 RX ORDER — TACROLIMUS 1 MG/1
1 CAPSULE ORAL EVERY 12 HOURS
Qty: 180 CAPSULE | Refills: 3 | Status: SHIPPED | OUTPATIENT
Start: 2022-01-01

## 2022-01-01 RX ORDER — GABAPENTIN 300 MG/1
CAPSULE ORAL
Qty: 810 CAPSULE | Refills: 1 | Status: SHIPPED | OUTPATIENT
Start: 2022-01-01

## 2022-01-01 RX ORDER — EVEROLIMUS 1 MG/1
TABLET ORAL
Qty: 60 EACH | Refills: 5 | Status: SHIPPED | OUTPATIENT
Start: 2022-01-01 | End: 2022-01-01 | Stop reason: ALTCHOICE

## 2022-01-01 RX ORDER — TACROLIMUS 1 MG/1
1 CAPSULE ORAL 2 TIMES DAILY
Qty: 180 CAPSULE | Refills: 3 | Status: SHIPPED | OUTPATIENT
Start: 2022-01-01 | End: 2022-01-01

## 2022-01-01 RX ORDER — EVEROLIMUS 1 MG/1
2 TABLET ORAL DAILY
Qty: 30 EACH | Refills: 5 | Status: SHIPPED | OUTPATIENT
Start: 2022-01-01 | End: 2022-01-01

## 2022-01-01 RX ORDER — EVEROLIMUS 1 MG/1
1 TABLET ORAL DAILY
Qty: 30 EACH | Refills: 5 | Status: SHIPPED | OUTPATIENT
Start: 2022-01-01 | End: 2022-01-01 | Stop reason: DRUGHIGH

## 2022-01-01 RX ORDER — EVEROLIMUS 1 MG/1
1 TABLET ORAL DAILY
Qty: 30 EACH | Refills: 5 | Status: SHIPPED | OUTPATIENT
Start: 2022-01-01 | End: 2022-01-01 | Stop reason: SDUPTHER

## 2022-01-01 RX ORDER — METFORMIN HYDROCHLORIDE 500 MG/1
500 TABLET ORAL 2 TIMES DAILY WITH MEALS
COMMUNITY

## 2022-01-01 RX ORDER — EVEROLIMUS 1 MG/1
2 TABLET ORAL DAILY
Qty: 60 EACH | Refills: 5 | Status: SHIPPED | OUTPATIENT
Start: 2022-01-01 | End: 2022-01-01 | Stop reason: DRUGHIGH

## 2022-01-01 RX ORDER — TACROLIMUS 1 MG/1
CAPSULE ORAL EVERY 12 HOURS
COMMUNITY
End: 2022-01-01 | Stop reason: SDUPTHER

## 2022-01-06 NOTE — PROGRESS NOTES
Called and LM for patient to call back. Received CT scan results sent by her PCP which and patient will need MRI of the brain and neurosurgical consult.

## 2022-02-15 NOTE — PROGRESS NOTES
[facility], MD, MD Liliana Ragland, JENNIFER Jeffery, [facility]     April Amalia Gonzáles, PARDEEP Roman NP [address] [facility] [address]    at [facility]    [address] [facility], [address] [place] [address] Ariana Alarcon  22.    [phone]    FAX: 126 [address], [address]    [phone]    FAX: [phone]       Patient Care Team:  Annetta Herr MD as PCP - General (Family Medicine)  Meenakshi Castellano MD (Family Medicine)  Dominic Lemus MD (Cardiology)  Christofer Woo RN as Care Coordinator (Hepatology)      Problem List  Date Reviewed: 2/16/2022          Codes Class Noted    Iron deficiency anemia secondary to inadequate dietary iron intake ICD-10-CM: D50.8  ICD-9-CM: 280.1  3/12/2021        Long term current use of immunosuppressive drug ICD-10-CM: Z79.899  ICD-9-CM: V58.69  3/27/2017        Liver transplant complication ([facility] CHT-06-WO: T86.40  ICD-9-CM: 996.82  8/60/7622        Diastolic congestive heart failure ([facility] ICD-10-CM: I50.30  ICD-9-CM: 428.30, 428.0  9/29/2016        H/O liver transplant [facility]) ICD-10-CM: Z94.4  ICD-9-CM: V42.7  8/17/2016        S/P cataract surgery ICD-10-CM: Z98.49  ICD-9-CM: V45.61  8/17/2016        Osteoporosis ICD-10-CM: M81.0  ICD-9-CM: 733.00  8/17/2016        H/O cervical spine surgery ICD-10-CM: Z98.890  ICD-9-CM: V45.89  8/17/2016        Basal cell carcinoma ICD-10-CM: C44.91  ICD-9-CM: 173.91  8/17/2016    Overview Signed 8/17/2016 11:28 AM by Iliana Bynum MD     Multiple locations, face, back, neck, legs             S/P aortic valve replacement ICD-10-CM: Z95.2  ICD-9-CM: V43.3  8/17/2016        Pacemaker ICD-10-CM: Z95.0  ICD-9-CM: V45.01  8/17/2016        Essential tremor ICD-10-CM: G25.0  ICD-9-CM: 333.1  8/12/2013        Meningioma ([address] ICD-10-CM: D32.9  ICD-9-CM: 225.2  8/12/2013        Leg swelling ICD-10-CM: M79.89  ICD-9-CM: 729.81  Unknown        Atrial fib/flutter, transient ICD-9-CM: AKX7357  Unknown        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 The active problem list, all pertinent past medical history, medications, radiologic findings and laboratory findings related to the liver disorder were reviewed with the patient. The patient is a [de-identified] y.o.  female who underwent a liver transplant in 2003 at [facility] for cirrhosis and hepatocellular carcinoma. Unknown cause of underlying liver disease that resulted in cirrhosis. She was followed closely at [facility] after the LT for 3 years, then moved to [facility] for 5 years. She has been in [place] since 2011. The patient had an episode of acute chest pain and was evaluated in the ER at [facility] in 2020. She had lower extremity edema, fluctuation of tacrolimus levels. This has now normalized. Since the last office visit the patient developed chronic headaches. A head CT was ordered 12/2021 which demonstrated a mass. Further work-up was positive for metastatic squamous cell cancer. Radiation therapy was initiated 1/17/2022. The patient reports feeling better than she had in several years. There are no symptoms of liver disease. She completes all daily activities without any functional limitations. She has not experienced swelling of the abdomen, problems with concentration or itching. ASSESSMENT AND PLAN:  Liver transplant   This was performed at [facility] in 2003.     Have performed laboratory testing to monitor liver function and degree of liver injury. This included BMP, hepatic panel, CBC with platelet count and INR. Laboratory testing from 2/07/2022 reviewed in detail. The liver transaminases are normal. The ALP is elevated. The liver function is normal. The platelet count is normal.     There have been no episodes of graft rejection following transplant. Immune Suppression  Current dose of tacrolimus is 1 mg daily. Levels have been therapeutic ranging 2-7    Will change tacrolimus to everolimus 1 mg daily. Repeat level in 7-10 days. Treatment plan discussed with Dr. Woody Michaud who advised to start everolimus. Immuran is currently at 100 mg once daily. Once everolimus level is therapeutic will wean Immuran. Multiple skin cancers   The patient has developed multiple skin cancers. Pathology has been basal and squamous. Skin cancers are common after liver transplant due to chronic immune suppression. She is followed closely by dermatology. There have been areas of concern recently on the face, a topical ointment is being used to have these removed. A new lesion was found on the scalp that had metastasized to the brain. She is currently receiving Radiation therapy. Anemia  The HGB has been ranging 9-10 g/dl. This is multifactorial.   B12 and folate levels are normal.   She defers having a colonoscopy. PCP should order cologuard. Hepatocellular carcinoma  Incidental finding in the native liver at the time of transplant. Follow up screening is discontinued after 5 years post-transplant. No further testing required. Chronic kidney disease  Sr. Creatinine is normal.   NSAIDs should not be utilized as this may worsen CKD. Hypercholesterolemia   This is a common side effect of immune supression. She does not have hypercholesterolemia    Hypertension  This is a common side effect of immune suppression with calcineurin agents. She does not have hypertension at this time.     Treatment of other medical problems in patients with chronic liver disease  There are no contraindications for the patient to take any medications that are necessary for treatment of other medical issues. The patient does not consume alcohol daily. Normal doses of acetaminophen can be used for pain as needed. Counseling for alcohol in patients with chronic liver disease  The patient was counseled regarding alcohol consumption and the effect of alcohol on chronic liver disease. The patient has normal liver function. She can consume an occasional alcoholic beverage. Colonoscopy  The patient reports doing a Cologuard screening 2-3 years ago. She defers colonoscopy. Cologuard should be ordered by the PCP. Osteoporosis  The risk of osteoporosis is increased in following liver transplantation. DEXA bone density was completed 7/2019 which demonstrated osteopenia. Repeat testing will be due this year. Hypomagnesia  Low serum magnesium can be caused by immune suppression. The levels have been normal and she does not require supplementation. Vaccinations   Routine vaccinations against other bacterial and viral agents can be performed as long as live vaccines are not utilizied. Annual flu vaccination should be administered if indicated. Allergies   Allergen Reactions    Codeine Rash    Demerol (Pf) [Meperidine (Pf)] Rash       Current Outpatient Medications   Medication Sig    everolimus, immunosuppressive, 1 mg tab Take 1 mg by mouth daily. Indications: liver transplant rejection prevention    gabapentin (NEURONTIN) 300 mg capsule TAKE 3 CAPSULES 3 TIMES    DAILY; MAXIMUM DAILY       AMOUNT: 2,700MG    azaTHIOprine (IMURAN) 50 mg tablet Take 2 Tablets by mouth daily. Indications: liver transplant rejection prevention    POTASSIUM CITRATE PO Take 99 mg by mouth.  multivitamin (ONE A DAY) tablet Take 1 Tab by mouth daily.  bumetanide (BUMEX) 1 mg tablet Take  by mouth daily.     potassium chloride SR (KLOR-CON 10) 10 mEq tablet Take 90 mEq by mouth daily.  CALCIUM CARBONATE/MAG CARB/FA (MAGNESIUM-CALCIUM-FOLIC ACID PO) Take  by mouth.  spironolactone (ALDACTONE) 25 mg tablet Take  by mouth daily.  ALIPOIC ACID/BIOFLAV/MV/GR TEA (ULTRA SAMUEL PO) Take  by mouth.  Omeprazole delayed release (PRILOSEC D/R) 20 mg tablet Take 20 mg by mouth two (2) times a day.  WARFARIN SODIUM (WARFARIN PO) Take  by mouth. 1mg/2mg/5mg     bacitracin zinc (BACITRACIN) ointment Apply to incision as directed 3x/day (Patient not taking: Reported on 10/26/2021)     No current facility-administered medications for this visit. SYSTEM REVIEW NOT RELATED TO LIVER DISEASE OR REVIEWED ABOVE:  Constitution systems: Negative for fever, chills, weight gain, weight loss. Eyes: Negative for visual changes. ENT: Negative for sore throat, painful swallowing. Respiratory: Negative for cough, hemoptysis, SOB. Cardiology: Negative for chest pain, palpitations. H/O heart valve surgery. GI:  Negative for constipation or diarrhea. : Negative for urinary frequency, dysuria, hematuria, nocturia. Skin: Negative for rash. H/O skin cancer  Hematology: Negative for easy bruising, blood clots. Musculo-skelatal: Negative for back pain, muscle pain, weakness. Neurologic: Negative for headaches, dizziness, vertigo, memory problems not related to HE. Psychology: Negative for anxiety, depression. FAMILY HISTORY:  There is no family history of liver disease  There is no family history of heart disease    SOCIAL HISTORY:  The patient is . The patient has 3 children, 3 stepchildren, and 10 grandchildren, 1 great grandchild. The patient has never used tobacco products. The patient has never consumed significant amounts of alcohol. The patient used to work as in many different jobs.       PHYSICAL EXAMINATION:  Visit Vitals  BP (!) 153/80   Pulse 74   Temp 97 °F (36.1 °C) (Temporal)   Ht 5' 3\" (1.6 m)   Wt 144 lb (65.3 kg) BMI 25.51 kg/m²       General: No acute distress. Eyes: Sclera anicteric. ENT: No oral lesions. Thyroid normal.  Nodes: No adenopathy. Skin: No spider angiomata. No jaundice. No palmar erythema. Respiratory: Lungs clear to auscultation. Cardiovascular: Regular heart rate. No murmurs. No JVD. Abdomen: Soft non-tender, liver size normal to percussion/palpation. Spleen not palpable. No obvious ascites. Extremities: No edema. No muscle wasting. No gross arthritic changes. Neurologic: Alert and oriented. Cranial nerves grossly intact. No asterixis.     LABORATORY STUDIES:  [facility] of [address]  [address] Units 2/7/2022 10/12/2021   WBC 3.4 - 10.8 x10E3/uL 8.2 6.6   ANC 1.4 - 7.0 x10E3/uL 6.9 4.4   HGB 11.1 - 15.9 g/dL 9.9 (L) 10.3 (L)    - 450 x10E3/uL 232 204   INR 0.9 - 1.2 1.9 (H) 1.7 (H)   AST 0 - 40 IU/L 18 22   ALT 0 - 32 IU/L 12 14   Alk Phos 44 - 121 IU/L 126 (H) 116   Bili, Total 0.0 - 1.2 mg/dL 0.4 0.7   Bili, Direct 0.00 - 0.40 mg/dL 0.14 0.18   Albumin 3.7 - 4.7 g/dL 4.6 4.8 (H)   BUN 8 - 27 mg/dL 14 12   Creat 0.57 - 1.00 mg/dL 0.90 0.92   Na 134 - 144 mmol/L 136 139   K 3.5 - 5.2 mmol/L 4.0 4.3   Cl 96 - 106 mmol/L 94 (L) 96   CO2 20 - 29 mmol/L 26 29   Glucose 65 - 99 mg/dL 152 (H) 112 (H)   Magnesium 1.6 - 2.3 mg/dL 1.8 1.9     Liver [place] [address] Ref Rng & Units 6/14/2021   WBC 3.4 - 10.8 x10E3/uL    ANC 1.4 - 7.0 x10E3/uL    HGB 11.1 - 15.9 g/dL     - 450 x10E3/uL    INR 0.9 - 1.2    AST 0 - 40 IU/L 23   ALT 0 - 32 IU/L 14   Alk Phos 44 - 121 IU/L 106   Bili, Total 0.0 - 1.2 mg/dL 0.5   Bili, Direct 0.00 - 0.40 mg/dL 0.17   Albumin 3.7 - 4.7 g/dL 4.7   BUN 8 - 27 mg/dL 17   Creat 0.57 - 1.00 mg/dL 0.93   Na 134 - 144 mmol/L 138   K 3.5 - 5.2 mmol/L 4.5   Cl 96 - 106 mmol/L 98   CO2 20 - 29 mmol/L 25   Glucose 65 - 99 mg/dL 94   Magnesium 1.6 - 2.3 mg/dL      Liver Virology and Transplant Immune Suppression Tacrolimus Level   Latest Ref Rng & Units 2.0 - 20.0 ng/mL   2/7/2022 1.9 (L)   10/12/2021 3.6   6/14/2021 2.3   3/29/2021 7.0     Laboratory testing from 2/07/2022 reviewed in detail. Additional testing included to evaluate progression or regression of disease. Laboratory testing results from today will be communicated by My Chart. SEROLOGIES:  Serologies Latest Ref Rng & Units 8/17/2016   Hep B Surface Ag Negative Negative   Hep B Core Ab, Total Negative Negative   Hep B Surface AB QL  Non Reactive     Serologies Latest Ref Rng & Units 3/29/2021   Ferritin 15 - 150 ng/mL 69   Iron % Saturation 15 - 55 % 18     LIVER HISTOLOGY:  Not available or performed    ENDOSCOPIC PROCEDURES:  Not available or performed    RADIOLOGY:  Not available or performed    OTHER TESTING:  Not available or performed    FOLLOW-UP:  All of the issues listed above in the Assessment and Plan were discussed with the patient. All questions were answered. The patient expressed a clear understanding of the above. [facility] in 3 months. Laboratory testing will be completed every 2 weeks until everolimus level is therapeutic and she is weaned off immuran. Vanessa Gupta, [facility] of [address] [facility] [address] [address] [address] [address], [address] Ariana Alarcon  22.  [address] [facility]

## 2022-02-15 NOTE — PROGRESS NOTES
Identified pt with two pt identifiers(name and ). Reviewed record in preparation for visit and have obtained necessary documentation. No chief complaint on file. Vitals:    02/15/22 1141   BP: (!) 153/80   Pulse: 74   Temp: 97 °F (36.1 °C)   TempSrc: Temporal   Weight: 144 lb (65.3 kg)   Height: 5' 3\" (1.6 m)   PainSc:   4       Health Maintenance Review: Patient reminded of \"due or due soon\" health maintenance. I have asked the patient to contact his/her primary care provider (PCP) for follow-up on his/her health maintenance. Coordination of Care Questionnaire:  :   1) Have you been to an emergency room, urgent care, or hospitalized since your last visit? If yes, where when, and reason for visit? no       2. Have seen or consulted any other health care provider since your last visit? If yes, where when, and reason for visit? YES      Patient is accompanied by self I have received verbal consent from Wilbert Nash to discuss any/all medical information while they are present in the room.

## 2022-02-25 NOTE — TELEPHONE ENCOUNTER
1:10pm - Received call from patient to let me know that she had received the Everolimus prescription and needed instructions for taking it and weaning her other medication. 4:00pm - Returned call to patient, start Everolimus tomorrow morning - take 1 tab which is 1mg daily, also tomorrow cut Imuran dose in half - take 1 tab instead of 2 daily, continue this and get labs on Tuesday morning. Lab orders faxed to [facility]. Patient verbalized understanding with repeat back. independent

## 2022-03-08 NOTE — TELEPHONE ENCOUNTER
Reviewed lab results with Ms. Gupta and orders received, called patient and let her know to stop taking the Azathioprine and increase Everolimus to 2mg - 2 tabs daily. We will re-check labs in one week. I will update prescriptions with patient's pharmacy and send lab requisition in the mail to check labs. Patient verbalized understanding via repeat back.

## 2022-03-16 NOTE — TELEPHONE ENCOUNTER
03/15/22 4pm - Received VM from patient, she picked up her Everolimus prescription and they only gave her a 2 week supply with her co-pay of $115. She states that she cannot afford to pay this amount for this medications every two weeks. She had tried to reach Ms. Gupta about this issue, but called me to request a 30 or 90 day supply - she states that at least if she has to pay a large amount for a 90 days supply she will have more time to figure out how she is going to pay for it.     03/16/22 - Reviewed previous prescription sent in for Everolimus -  It was sent to [facility] - called to understand why a 2 week amount of medication was dispensed since the prescription was written to dispense a 30 day supply. Pharmacy states they have not dispensed the new prescription and are waiting for the patient to call before sending it mail order. Called patient and found out that she filled the prescription at [facility] locally - let her know that Shantell Tsais would have to submit an override to get the medication for her right now, patient states she has enough for 2 1/2 weeks at this time. We discussed that [facility] mail order [facility] pharmacy may have been rates on her medication and that's why we sent in the prescription there - pharmacist stated that her last Everolimus fill cost $150 for one month because the increase to 2mg daily. Patient expressed concern over the cost of this medication, Medicare does not allow co-pay assistance being a government issued insurance plan. I shared the reason for change to this medication is for the anti-cancer properties of the drug, especially since she is undergoing treatment for [facility] treatment at this time - we can check into PAP with [employer] for the medication - appears based on website information that patient is eligible. I will check with Ms. Gupta to determine medication dosage and prepare the form to submit from our office - patient will need to submit her portion of the form. Patient states she will call me back this afternoon to get instructions to access the form online. 3/16/22 1:35pm - patient returned call, reviewed PAP forms and instructions. Requested to email to her home so that she can complete with her daughter's assistance. Emailed application and instructions to [email or web address] via [employer].

## 2022-03-18 NOTE — TELEPHONE ENCOUNTER
Patient called to be sure that I received her application for [employer] PAP via email. I confirmed that I did receive the email and PDF of her application. Let her know that we would touch base next week on the status of its approval.     Completed PAP forms and attached patient application and provided to Ms. Gupta to write the prescription and sign.

## 2022-03-24 NOTE — TELEPHONE ENCOUNTER
Received call from the patient letting me know that over the past week she has had a couple of episodes of leg weakness, so much so that her legs \"buckled\" underneath her yesterday. She called to ask if this was a side effect of the new Everolimus medication. Reviewed side effects as noted on the pharmaceutical company's website - cites lethargy and tiredness, but no necessarily leg weakness, she also mentions having new mouth sores which is on the side effect list. Checked with patient regarding any new medications. She states she started Lisinopril a few days ago that was prescribed by her Cardiologist. I asked her if she was taking her blood pressures to see how she was responding to the new medication. She said no, requested that she check her BP at least twice a day in the AM and PM, can also check laying down and standing up to see if there is a difference - also if she has an episode of \"weakness\" as she describes to check her blood pressure too. Reminded to stay well hydrated and drink plenty of water since she is on diuretics as well. She verbalized understanding. I let her know that I will check with April to see if she is aware of the type of side effects reported by patient and will get back to her, in the meantime change Everolimus dosage to 1mg in the am and 1 mg in the pm to split the dose. I will call her back tomorrow after I talk to Ms. Gupta.

## 2022-04-01 NOTE — TELEPHONE ENCOUNTER
Called patient to check in and see how she was feeling since our last conversation. Patient states that she went in to see her Radiation Oncologist and was having some testing for a few days - she has an MRI this morning. He started prednisone and she states that he blood work is going to be all out of whack for now, she will restart her Everolimus 1mg in the am and 1mg in the pm when she gets home this afternoon. I let her know that I would follow up and check in on her next week.

## 2022-04-05 NOTE — TELEPHONE ENCOUNTER
Spoke to Ms. Malik Varghese, she is home from the hospital. Her Radiation Oncologist believes that the weakness in her legs that she was having was a result of the radiation treatments she recently received - he restarted her prednisone. She states that she is up and walking again without assistance. She states that her doctor said the MRI complete in the hospital showed the tumor was shrinking and responding well to radiation - she was very pleased to hear this. He will repeat the MRI in 6-8 weeks. She restarted her Everolimus on Saturday and can go in to get her labs repeated on Friday, 4/8. I will ensure that standing orders are faxed to [facility].     We reviewed the [employer] response to the PAP application - she was denied because the company is no longer giving assistance for [employer]. Her specialty pharmacy [facility] has completed an application for drug reimbursement assistance and needs her financial records/income statement to complete the application. Provided patient the pharmacy number to call and provide the statement to. Patient verbalized understanding.

## 2022-04-14 NOTE — TELEPHONE ENCOUNTER
Received VM from patient, she is back in the hospital and will not be able to get labs repeated tomorrow.    5:00pm - attempted to call patient's cell phone, but did not get a response to leave voicemail. Will try again later.

## 2022-04-20 NOTE — TELEPHONE ENCOUNTER
Called to check in on patient - she was discharged from [address] [place] [address] on Saturday. She is feeling better, states doctors are not sure why she had another episode of weakness in her extremities but she says this time was worse and she was out of it almost unconscious. Her doctor at [address] [place] [address] has stopped the steriod dosage she was on before hospitalized because it was too much and started her on  hydrocortisone 5mg in the AM and 3mg in the PM for one week - patient will see doctor again at that point to reassess. She was using a walker on Saturday but is feeling stronger an is only using her cane now. Reviewed labs from 4/18 with patient - LFTs are all normal, blood counts are better, waiting for Everolimus level. Will call this week when level is resulted. Patient verbalized understanding.

## 2022-05-02 NOTE — TELEPHONE ENCOUNTER
Spoke with patient, she states that the Everolimus medication cost her over $250 this month and she is no able to afford this medication on a monthly basis. She discussed this issue with her daughter and feels that she should go back to her tacrolimus medication where she could get it for $20 90 day supply. I let patient know that I would discuss with MsGris Candy. Spoke with April and shared the above conversation with patient, will change to Sirolimus if co-pay is affordable for patient - I will check with patient's pharmacy to find out the co-pay before ordering. Called patient's insurance, prior authorization will be needed for this medication, if approved co-pay will be approximately $9.50 per month for Sirolimus 1mg tablets, dispense of 60. System states there is a contraindication to prescribing Sirolimus for patient with carcinoma. MsGris Thanh Chowdhury spoke with Dr. Hua Marquez, he recommends just restarting her on tacrolimus.

## 2022-05-05 NOTE — PROGRESS NOTES
[address] [facility],5Th Floor  [address]   [address] [place] [address] Critical access hospital0 [facility]   [phone] Office   [phone] Fax           Date:  22     Name:  Roseanne Valdez  :    MRN:  [id number]     PCP:  Viry Sánchez MD    Kirstin Diaz is a [de-identified] y.o. female who was seen by synchronous (real-time) audio-video technology on 2022 for No chief complaint on file. Subjective: Follow up for glossopharyngeal neuralgia and essential tremor. 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 She has another brain scan planned to see her progress. Due to the treatments and high steroids, she did have some complications that resulted in hospitalizations but she is feeling much better now. The tremor was a little worse while she was on the steroids but now that this has been discontinued, the tremor has returned to baseline. No pain. She has not had any headaches since she was diagnosed with the tumor. She has not had any focal neurological deficit or seizure. She is still taking Coumadin but the plan is to switch her to Eliquis. Recap from LOV:  Essential tremor is a little more noticeable since her last visit. Suggested bumping the gabapentin to 900mg tid. Otherwise, glossopharyngeal neuralgia is well managed and is not at all problematic. She tolerates the higher dose of gabapentin, I will plan to see her back in 1 year. As always, she can call or come back in earlier if needed. Current Outpatient Medications   Medication Sig    [START ON 6/3/2022] tacrolimus (Prograf) 1 mg capsule Take 1 Capsule by mouth two (2) times a day.  Indications: liver transplant rejection prevention    gabapentin (NEURONTIN) 300 mg capsule TAKE 3 CAPSULES 3 TIMES    DAILY; MAXIMUM DAILY       AMOUNT: 2,700MG    bacitracin zinc (BACITRACIN) ointment Apply to incision as directed 3x/day (Patient not taking: Reported on 10/26/2021)    POTASSIUM CITRATE PO Take 99 mg by mouth.  multivitamin (ONE A DAY) tablet Take 1 Tab by mouth daily.  bumetanide (BUMEX) 1 mg tablet Take  by mouth daily.  potassium chloride SR (KLOR-CON 10) 10 mEq tablet Take 90 mEq by mouth daily.  CALCIUM CARBONATE/MAG CARB/FA (MAGNESIUM-CALCIUM-FOLIC ACID PO) Take  by mouth.  spironolactone (ALDACTONE) 25 mg tablet Take  by mouth daily.  ALIPOIC ACID/BIOFLAV/MV/GR TEA (ULTRA SAMUEL PO) Take  by mouth.  Omeprazole delayed release (PRILOSEC D/R) 20 mg tablet Take 20 mg by mouth two (2) times a day.  WARFARIN SODIUM (WARFARIN PO) Take  by mouth. 1mg/2mg/5mg      No current facility-administered medications for this visit. Allergies   Allergen Reactions    Codeine Rash    Demerol (Pf) [Meperidine (Pf)] Rash      Past Medical History:   Diagnosis Date    Atrial fib/flutter, transient     Benign meningioma of brain ([facility] [address]     COVID-19 vaccine series completed 04/10/2021    Moderna    Leg swelling     Liver disease     Memory loss     Skipped beats     SOB (shortness of breath)      Past Surgical History:   Procedure Laterality Date    HX ORTHOPAEDIC      GA ABDOMEN SURGERY PROC UNLISTED      GA CARDIAC SURG PROCEDURE UNLIST  04/30/2013    valve replacement      reports that she has never smoked. She has never used smokeless tobacco. She reports current alcohol use. She reports that she does not use drugs. family history includes Heart Disease in her father. ROS    Objective:   No flowsheet data found. General:  Well defined, nourished, and groomed individual in no acute distress. Psych:  Good mood and bright affect    NEUROLOGICAL EXAMINATION:     Mental Status:   Alert and oriented to person, place, and time with recent and remote memory intact.   Attention span and concentration are normal. Speech is fluent with a full fund of knowledge. Cranial Nerves:  I: smell Not tested   II: visual fields Not assessed   II: pupils Equal, round, reactive to light   II: optic disc Not assessed   III,VII: ptosis none   III,IV,VI: extraocular muscles  Full ROM   V: mastication normal   V: facial light touch sensation  Not assessed   VII: facial muscle function   symmetric   VIII: hearing symmetric   IX: soft palate elevation  normal   XI: trapezius strength  Not assessed   XI: sternocleidomastoid strength Not assessed   XI: neck flexion strength  Not assessed   XII: tongue  midline     Motor Examination: Normal tone and bulk. Strength was not assessed      Sensory exam:  Not assessed     Coordination:  Heel-to-shin was smooth and symmetrical bilaterally. Finger to nose and rapid arm movement testing was normal.   No resting or intention tremor    Gait and Station:  Steady while walking on toes, heels, and with tandem walking. Normal arm swing. No pronator drift. No muscle wasting or fasiculations noted. Reflexes:  Not assessed    Assessment & Plan:       ICD-10-CM ICD-9-CM    1. Essential tremor  G25.0 333.1    2. Glossopharyngeal neuralgia  G52.1 352.1      Essential tremor and glossopharyngeal neuralgia are stable on present therapy of gabapentin 300mg three times a day without signs or symptoms of side effect. She will continue with this as previously prescribed. We discussed potential for the tumor putting pressure on brain structures if it expands that may result in a variety of neurological symptoms to include headache and seizures. She should call the office immediately if this occurs. Otherwise, I will plan to see her in one year. We discussed the expected course, resolution and complications of the diagnosis(es) in detail. Medication risks, benefits, costs, interactions, and alternatives were discussed as indicated.   I advised her to contact the office if her condition worsens, changes or fails to improve as anticipated. She expressed understanding with the diagnosis(es) and plan. Liliya Martinez, was evaluated through a synchronous (real-time) audio-video encounter. The patient (or guardian if applicable) is aware that this is a billable service, which includes applicable co-pays. Verbal consent to proceed has been obtained. The visit was conducted pursuant to the emergency declaration under the [address] [place] [address] and the [employer] General Act. Patient identification was verified, and a caregiver was present when appropriate. The patient was located at home in a state where the provider was licensed to provide care.       Kath Tello NP

## 2022-05-31 NOTE — PROGRESS NOTES
[address] [facility], [place], [address], [facility], [place]      Rosettagary Aquino, JENNIFER Potter, [facility]-BC     Vanessa Gupta, [facility]   DOMINIQUE Espinosa-BRENDA Light, [facility]       Sharon Faulkner [address]    at [address], [facility] Ariana Lynne  22. [phone]    FAX: [address] [place] [address], [address]    [phone]    FAX: [phone]         Patient Care Team:  Khushi Hedrick MD as PCP - General (Family Medicine)  Annalee Banerjee MD (Family Medicine)  Saij Goss MD (Cardiovascular Disease Physician)  Aleja Becker, RN as Care Coordinator (Hepatology)      Problem List  Date Reviewed: 5/31/2022          Codes Class Noted    Iron deficiency anemia secondary to inadequate dietary iron intake ICD-10-CM: D50.8  ICD-9-CM: 280.1  3/12/2021        Long term current use of immunosuppressive drug ICD-10-CM: Z79.899  ICD-9-CM: V58.69  3/27/2017        Liver transplant complication ([facility] WNS-70-IJ: T86.40  ICD-9-CM: 996.82  4/38/7111        Diastolic congestive heart failure ([facility] ICD-10-CM: I50.30  ICD-9-CM: 428.30, 428.0  9/29/2016        H/O liver transplant [facility]) ICD-10-CM: Z94.4  ICD-9-CM: V42.7  8/17/2016        S/P cataract surgery ICD-10-CM: Z98.49  ICD-9-CM: V45.61  8/17/2016        Osteoporosis ICD-10-CM: M81.0  ICD-9-CM: 733.00  8/17/2016        H/O cervical spine surgery ICD-10-CM: Z98.890  ICD-9-CM: V45.89  8/17/2016        Basal cell carcinoma ICD-10-CM: C44.91  ICD-9-CM: 173.91  8/17/2016    Overview Addendum 5/31/2022  2:10 PM by Vanessa Nichole NP     Multiple locations, face, back, neck, legs    Metastatic to brain              S/P aortic valve replacement ICD-10-CM: Z95.2  ICD-9-CM: V43.3 8/17/2016        Pacemaker ICD-10-CM: Z95.0  ICD-9-CM: V45.01  8/17/2016        Essential tremor ICD-10-CM: G25.0  ICD-9-CM: 333.1  8/12/2013        Meningioma ([facility] [address] ICD-10-CM: D32.9  ICD-9-CM: 225.2  8/12/2013        Leg swelling ICD-10-CM: M79.89  ICD-9-CM: 729.81  Unknown        Atrial fib/flutter, transient ICD-9-CM: AIZ2562  Unknown        Glossopharyngeal neuralgia ICD-10-CM: G52.1  ICD-9-CM: 352.1  10/24/2012              Maya Kwon returns to the [address] for management of liver graft function and immune suppression following a liver transplant. The active problem list, all pertinent past medical history, medications, radiologic findings and laboratory findings related to the liver disorder were reviewed with the patient. The patient is a [de-identified] y.o.  female who underwent a liver transplant in 2003 at [facility] for cirrhosis and hepatocellular carcinoma. Unknown cause of underlying liver disease that resulted in cirrhosis. She was followed closely at [facility] after the LT for 3 years, then moved to [facility] for 5 years. She has been in [place] since 2011. The patient had an episode of acute chest pain and was evaluated in the ER at [facility] in 2020. She had lower extremity edema, fluctuation of tacrolimus levels. This has now normalized. The patient developed chronic headaches. A head CT was ordered 12/2021 which demonstrated a mass. Further work-up was positive for metastatic squamous cell cancer. Radiation therapy was initiated 1/17/2022. Since the last office visit the patient was hospitalized x 3. She was diagnosed with sepsis and is now on a 6 week course of IV ABX. Lower extremity edema developed during hospitalization. A cardiac workup was completed and normal. She was unable to afford the everolimus and was changed back to tacrolimus 1 mg twice daily 5/2022. The patient reports feeling better than she had in several years.  There are no symptoms of liver disease. She completes all daily activities without any functional limitations. She has not experienced swelling of the abdomen, problems with concentration or itching. ASSESSMENT AND PLAN:  Liver transplant   This was performed at [facility] in 2003. Have performed laboratory testing to monitor liver function and degree of liver injury. This included BMP, hepatic panel, CBC with platelet count and INR. Laboratory testing from 4/18/2022 reviewed in detail. Follow-up testing ordered today. The liver transaminases, ALP, liver function and platelet count are normal.     There have been no episodes of graft rejection following transplant. Immune Suppression  Current dose of tacrolimus is 1 mg daily. Levels have been therapeutic ranging 2-7    The patient was changed to everolimus due to diagnosis of skin cancer. The liver enzymes remained normal. Immuran was discontinued. The patient was unable to afford the medication and was changed back to tacrolimus 1 mg twice daily 5/2022. Will repeat laboratory testing along with tacrolimus level. Will maintain level ~3.     Multiple skin cancers   The patient has developed multiple skin cancers. Pathology has been basal and squamous. 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 Anemia  The HGB has been ranging 9-10 g/dl. This is multifactorial.   B12 and folate levels are normal.   She defers having a colonoscopy. PCP should order cologuard. Hepatocellular carcinoma  Incidental finding in the native liver at the time of transplant. Follow up screening is discontinued after 5 years post-transplant. No further testing required. Chronic kidney disease  Sr. Creatinine is normal.   NSAIDs should not be utilized as this may worsen CKD. Hypercholesterolemia   This is a common side effect of immune supression.     She does not have hypercholesterolemia    Hypertension  This is a common side effect of immune suppression with calcineurin agents. She does not have hypertension at this time. Treatment of other medical problems in patients with chronic liver disease  There are no contraindications for the patient to take any medications that are necessary for treatment of other medical issues. The patient does not consume alcohol daily. Normal doses of acetaminophen can be used for pain as needed. Counseling for alcohol in patients with chronic liver disease  The patient was counseled regarding alcohol consumption and the effect of alcohol on chronic liver disease. The patient has normal liver function. She can consume an occasional alcoholic beverage. Colonoscopy  The patient reports doing a Cologuard screening 2-3 years ago. She defers colonoscopy. Cologuard should be ordered by the PCP. Osteoporosis  The risk of osteoporosis is increased in following liver transplantation. DEXA bone density was completed 7/2019 which demonstrated osteopenia. Repeat testing will be due this year. Hypomagnesia  Low serum magnesium can be caused by immune suppression. The levels have been normal and she does not require supplementation. Vaccinations   Routine vaccinations against other bacterial and viral agents can be performed as long as live vaccines are not utilizied. Annual flu vaccination should be administered if indicated. Allergies   Allergen Reactions    Codeine Rash    Demerol (Pf) [Meperidine (Pf)] Rash       Current Outpatient Medications   Medication Sig    metFORMIN (GLUCOPHAGE) 500 mg tablet Take 500 mg by mouth two (2) times daily (with meals).  apixaban (ELIQUIS) 5 mg tablet Take 5 mg by mouth two (2) times a day.  tacrolimus (PROGRAF) 1 mg capsule Take 1 Capsule by mouth every twelve (12) hours.     gabapentin (NEURONTIN) 300 mg capsule TAKE 3 CAPSULES 3 TIMES    DAILY; MAXIMUM DAILY       AMOUNT: 2,700MG    multivitamin (ONE A DAY) tablet Take 1 Tab by mouth daily.  bumetanide (BUMEX) 1 mg tablet Take  by mouth daily.  potassium chloride SR (KLOR-CON 10) 10 mEq tablet Take 90 mEq by mouth daily.  spironolactone (ALDACTONE) 25 mg tablet Take  by mouth daily.  ALIPOIC ACID/BIOFLAV/MV/GR TEA (ULTRA SAMUEL PO) Take  by mouth.  Omeprazole delayed release (PRILOSEC D/R) 20 mg tablet Take 20 mg by mouth two (2) times a day. No current facility-administered medications for this visit. SYSTEM REVIEW NOT RELATED TO LIVER DISEASE OR REVIEWED ABOVE:  Constitution systems: Negative for fever, chills, weight gain, weight loss. Eyes: Negative for visual changes. ENT: Negative for sore throat, painful swallowing. Respiratory: Negative for cough, hemoptysis, SOB. Cardiology: Negative for chest pain, palpitations. H/O heart valve surgery. GI:  Negative for constipation or diarrhea. : Negative for urinary frequency, dysuria, hematuria, nocturia. Skin: Negative for rash. H/O skin cancer  Hematology: Negative for easy bruising, blood clots. Musculo-skelatal: Negative for back pain, muscle pain, weakness. Neurologic: Negative for headaches, dizziness, vertigo, memory problems not related to HE. Psychology: Negative for anxiety, depression. FAMILY HISTORY:  There is no family history of liver disease  There is no family history of heart disease    SOCIAL HISTORY:  The patient is . The patient has 3 children, 3 stepchildren, and 10 grandchildren, 1 great grandchild. The patient has never used tobacco products. The patient has never consumed significant amounts of alcohol. The patient used to work as in many different jobs. PHYSICAL EXAMINATION:  Visit Vitals  BP (!) 129/56 (BP 1 Location: Left upper arm, BP Patient Position: Sitting, BP Cuff Size: Adult)   Pulse 70   Temp 97.5 °F (36.4 °C) (Temporal)   Ht 5' 3\" (1.6 m)   Wt 150 lb (68 kg)   BMI 26.57 kg/m²       General: No acute distress. Eyes: Sclera anicteric. ENT: No oral lesions. Thyroid normal.  Nodes: No adenopathy. Skin: No spider angiomata. No jaundice. No palmar erythema. Respiratory: Lungs clear to auscultation. Cardiovascular: Regular heart rate. No murmurs. No JVD. Abdomen: Soft non-tender, liver size normal to percussion/palpation. Spleen not palpable. No obvious ascites. Extremities: 3+ pitting edema b/l. No muscle wasting. No gross arthritic changes. Neurologic: Alert and oriented. Cranial nerves grossly intact. No asterixis. LABORATORY STUDIES:  Liver [place] of [address] & Units 4/18/2022 4/8/2022   WBC 3.4 - 10.8 x10E3/uL 11.8 (H) 8.8   ANC 1.4 - 7.0 x10E3/uL 9.8 (H) 6.6   HGB 11.1 - 15.9 g/dL 9.2 (L) 8.4 (L)    - 450 x10E3/uL 182 225   INR 0.9 - 1.2     AST 0 - 40 IU/L 17 21   ALT 0 - 32 IU/L 26 23   Alk Phos 44 - 121 IU/L 84 88   Bili, Total 0.0 - 1.2 mg/dL 0.5 0.4   Bili, Direct 0.00 - 0.40 mg/dL     Albumin 3.7 - 4.7 g/dL 4.4 4.1   BUN 8 - 27 mg/dL 25 14   Creat 0.57 - 1.00 mg/dL 0.92 0.81   Na 134 - 144 mmol/L 134 133 (L)   K 3.5 - 5.2 mmol/L 4.2 4.3   Cl 96 - 106 mmol/L 89 (L) 92 (L)   CO2 20 - 29 mmol/L 26 24   Glucose 65 - 99 mg/dL 86 129 (H)   Magnesium 1.6 - 2.3 mg/dL 1.7 1.9     Liver Virology and Transplant Immune Suppression Everolimus   Latest Ref Rng & Units 3.0 - 8.0 ng/mL   4/18/2022 4.0   4/8/2022 5.8   3/15/2022 8.9 (H)   3/1/2022 2.3 (L)     Laboratory testing from 4/18/2022 reviewed in detail. Additional testing included to evaluate progression or regression of disease. Laboratory testing results from today will be communicated by My Chart.      SEROLOGIES:  Serologies Latest Ref Rng & Units 8/17/2016   Hep B Surface Ag Negative Negative   Hep B Core Ab, Total Negative Negative   Hep B Surface AB QL  Non Reactive     Serologies Latest Ref Rng & Units 3/29/2021   Ferritin 15 - 150 ng/mL 69   Iron % Saturation 15 - 55 % 18     LIVER HISTOLOGY:  Not available or performed    ENDOSCOPIC PROCEDURES:  Not available or performed    RADIOLOGY:  Not available or performed    OTHER TESTING:  Not available or performed    FOLLOW-UP:  All of the issues listed above in the Assessment and Plan were discussed with the patient. All questions were answered. The patient expressed a clear understanding of the above. [address] [address] in 3 months for ongoing monitoring if the laboratory testing is normal.       Vanessa Gupta AGPCNP-BC  [facility]  [address] [address] [facility] [address] [address] [address] Alarcon, [address] [place] [address] 22.  [address] [facility]

## 2022-05-31 NOTE — PROGRESS NOTES
Identified pt with two pt identifiers(name and ). Reviewed record in preparation for visit and have obtained necessary documentation. Chief Complaint   Patient presents with    Other     3month follow up H/o of liver txp      Vitals:    22 1151   BP: (!) 129/56   Pulse: 70   Temp: 97.5 °F (36.4 °C)   TempSrc: Temporal   Weight: 150 lb (68 kg)   Height: 5' 3\" (1.6 m)   PainSc:   0 - No pain       Health Maintenance Review: Patient reminded of \"due or due soon\" health maintenance. I have asked the patient to contact his/her primary care provider (PCP) for follow-up on his/her health maintenance. Coordination of Care Questionnaire:  :   1) Have you been to an emergency room, urgent care, or hospitalized since your last visit? If yes, where when, and reason for visit? Yes. [facility]       2. Have seen or consulted any other health care provider since your last visit? If yes, where when, and reason for visit? NO      Patient is accompanied by daughter I have received verbal consent from Erick Ann to discuss any/all medical information while they are present in the room.

## 2022-06-03 NOTE — TELEPHONE ENCOUNTER
Reviewed labs with Vanessa Gupta NP. Called patient and reviewed lab results. She's getting home IV antibiotic therapy (Vancomycin) and has weekly labs drawn. Home health nurse has mentioned that her hemoglobin is in the 7s. Pt denies obvious bleeding. She said she feels stronger and only new symptom is LE swelling, which cardiology is evaluating. Pt has an appointment with heme/onc on Monday. She said Dr. Theron Barajas told her she's not a candidate for chemotherapy, but he wanted to see her back for anemia work up. I told pt I'd fax results to him. Updated April, NP. Pt to follow up with heme/onc as planned. Called [facility] and let  staff know I'd be sending records. Faxed labs and office visit summary to [phone].     ID: Dr. Evans   IV abx until: 7/1/2022

## 2022-06-08 NOTE — TELEPHONE ENCOUNTER
Called and spoke to patient to check in, labs were drawn today by home health - will await results. Since tac level was low we reviewed her dosage, she realized that she had only been taking 1 tac capsule daily and not 1 in the am and 1 in the pm - she will correct that today - labs are pending. I will check in when results are available.

## 2022-06-10 NOTE — PROGRESS NOTES
Repeat blood work in 1 week. Potassium low, may need replacement. TAC level low. May need to increase dose.

## 2022-09-08 NOTE — PROGRESS NOTES
Identified pt with two pt identifiers(name and ). Reviewed record in preparation for visit and have obtained necessary documentation. Chief Complaint   Patient presents with    Follow-up      Vitals:    22 0947 22 0953   BP: (!) 169/73 (!) 148/71   Pulse: 77 73   Temp: 98.6 °F (37 °C)    TempSrc: Tympanic    SpO2: 93%    Weight: 148 lb (67.1 kg)    Height: 5' 3\" (1.6 m)    PainSc:   6    PainLoc: Flank        Health Maintenance Review: Patient reminded of \"due or due soon\" health maintenance. I have asked the patient to contact his/her primary care provider (PCP) for follow-up on his/her health maintenance. Coordination of Care Questionnaire:  :   1) Have you been to an emergency room, urgent care, or hospitalized since your last visit? If yes, where when, and reason for visit? yes       2. Have seen or consulted any other health care provider since your last visit? If yes, where when, and reason for visit? YES      Patient is accompanied by daughter I have received verbal consent from Albina Villanueva to discuss any/all medical information while they are present in the room.

## 2022-09-08 NOTE — PROGRESS NOTES
[address] [facility], MD, [address], [place], [place]      JENNIFER Wheat, ACNP-BC     Vanessa Gupta, [facility]   Izora Hashimoto, FNP-C    Christopher Dover, [facility]       Sharon Faulkner [facility] [address] 136    at [address], [address] [place] [address], [facility] 22.    [phone]    FAX: [address] [place] [address], [address]    [phone]    FAX: [phone]         Patient Care Team:  Kalani Montana MD as PCP - General (Family Medicine)  Mary Anne Resendiz MD (Family Medicine)  Tyler Keen MD (Cardiovascular Disease Physician)  Kameron Zamudio RN as Care Coordinator (Hepatology)  Christian Cortez MD as Physician (Hematology and Oncology)  Nomi Yen MD (Radiation Oncology)      Problem List  Date Reviewed: 9/8/2022            Codes Class Noted    Iron deficiency anemia secondary to inadequate dietary iron intake ICD-10-CM: D50.8  ICD-9-CM: 280.1  3/12/2021        Long term current use of immunosuppressive drug ICD-10-CM: Z79.899  ICD-9-CM: V58.69  3/27/2017        Liver transplant complication ([facility].) RQI-61-WS: T86.40  ICD-9-CM: 996.82  9/62/2433        Diastolic congestive heart failure ([facility].) ICD-10-CM: I50.30  ICD-9-CM: 428.30, 428.0  9/29/2016        H/O liver transplant [facility]) ICD-10-CM: Z94.4  ICD-9-CM: V42.7  8/17/2016        S/P cataract surgery ICD-10-CM: Z98.49  ICD-9-CM: V45.61  8/17/2016        Osteoporosis ICD-10-CM: M81.0  ICD-9-CM: 733.00  8/17/2016        H/O cervical spine surgery ICD-10-CM: Z98.890  ICD-9-CM: V45.89  8/17/2016        Basal cell carcinoma ICD-10-CM: C44.91  ICD-9-CM: 173.91  8/17/2016    Overview Addendum 5/31/2022  2:10 PM by Ayse Issa, April IBETH, NP     Multiple locations, face, back, neck, legs    Metastatic to brain              S/P aortic valve replacement ICD-10-CM: Z95.2  ICD-9-CM: V43.3  8/17/2016        Pacemaker ICD-10-CM: Z95.0  ICD-9-CM: V45.01  8/17/2016        Essential tremor ICD-10-CM: G25.0  ICD-9-CM: 333.1  8/12/2013        Meningioma ([address] ICD-10-CM: D32.9  ICD-9-CM: 225.2  8/12/2013        Leg swelling ICD-10-CM: M79.89  ICD-9-CM: 729.81  Unknown        Atrial fib/flutter, transient ICD-9-CM: OON9649  Unknown        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 The active problem list, all pertinent past medical history, medications, radiologic findings and laboratory findings related to the liver disorder were reviewed with the patient. The patient is a 81 y.o.  female who underwent a liver transplant in 2003 at [facility] for cirrhosis and hepatocellular carcinoma. Unknown cause of underlying liver disease that resulted in cirrhosis. She was followed closely at [facility] after the LT for 3 years, then moved to [facility] for 5 years. She has been in [place] since 2011. The patient had an episode of acute chest pain and was evaluated in the ER at [facility] in 2020. She had lower extremity edema, fluctuation of tacrolimus levels. This has now normalized. The patient developed chronic headaches. A head CT was ordered 12/2021 which demonstrated a mass. Further work-up was positive for metastatic squamous cell cancer. Radiation therapy was initiated 1/17/2022. The patient was hospitalized x 3. She was diagnosed with sepsis and is now on a 6 week course of IV ABX. Lower extremity edema developed during hospitalization. A cardiac workup was completed and normal. She was unable to afford the everolimus and was changed back to tacrolimus 1 mg twice daily 5/2022.      Since the last office visit the patient reports liquid, foul smelling stools with bloating. She is also having shortness of breath that has progressively gotten worse. Oxygen saturations were initially 83% today on room air then increased to 93%. The patient reports feeling better than she had in several years. There are no symptoms of liver disease. She completes all daily activities without any functional limitations. She has not experienced swelling of the abdomen, problems with concentration or itching. ASSESSMENT AND PLAN:  Liver transplant   This was performed at [facility] in 2003. Have performed laboratory testing to monitor liver function and degree of liver injury. This included BMP, hepatic panel, CBC with platelet count and INR. Laboratory testing from 6/13/2022 reviewed in detail. Follow-up testing ordered today. The liver transaminases, ALP, liver function and platelet count are normal.    Outside laboratory testing from 9/02/2022 demonstrated normal liver transaminases, ALP was slightly elevated. Liver function and platelet count were normal.     There have been no episodes of graft rejection following transplant. Immune Suppression  Current dose of tacrolimus is 1 mg twice daily. The level of TAC was 2.6 on 6/13/2022. Will order testing today for her to complete when she hasn't taken medication. The patient was changed to everolimus due to diagnosis of skin cancer. The liver enzymes remained normal. Immuran was discontinued. The patient was unable to afford the medication and was changed back to tacrolimus 1 mg twice daily 5/2022. Abdominal Pain/Diarrhea  She had a long course of antibiotic therapy. Will order stool for C-Diff. She is currently taking probiotics. Shortness of Breath  The oxygen saturations are decreasing with activity. This has gotten worse over time. Lung sounds diminished on left and crackles in the right lower lobe.  Advised she call oncology to inform them in the change of symptoms. Recommend imaging such as a chest CT. Multiple skin cancers   The patient has developed multiple skin cancers. Pathology has been basal and squamous. 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 Anemia  The HGB has been ranging 9-10 g/dl. This is multifactorial.   B12 and folate levels are normal.   She defers having a colonoscopy. PCP should order cologuard. Hepatocellular carcinoma  Incidental finding in the native liver at the time of transplant. Follow up screening is discontinued after 5 years post-transplant. No further testing required. Chronic kidney disease  Sr. Creatinine is normal.   NSAIDs should not be utilized as this may worsen CKD. Hypercholesterolemia   This is a common side effect of immune supression. She does not have hypercholesterolemia    Hypertension  This is a common side effect of immune suppression with calcineurin agents. She does not have hypertension at this time. Treatment of other medical problems in patients with chronic liver disease  There are no contraindications for the patient to take any medications that are necessary for treatment of other medical issues. The patient does not consume alcohol daily. Normal doses of acetaminophen can be used for pain as needed. Counseling for alcohol in patients with chronic liver disease  The patient was counseled regarding alcohol consumption and the effect of alcohol on chronic liver disease. The patient has normal liver function. She can consume an occasional alcoholic beverage. Colonoscopy  The patient reports doing a Cologuard screening 2-3 years ago. She defers colonoscopy. Cologuard should be ordered by the PCP. Osteoporosis  The risk of osteoporosis is increased in following liver transplantation. DEXA bone density was completed 7/2019 which demonstrated osteopenia. Repeat testing will be due this year.      Hypomagnesia  Low serum magnesium can be caused by immune suppression. The levels have been normal and she does not require supplementation. Vaccinations   Routine vaccinations against other bacterial and viral agents can be performed as long as live vaccines are not utilizied. Annual flu vaccination should be administered if indicated. Allergies   Allergen Reactions    Codeine Rash    Demerol (Pf) [Meperidine (Pf)] Rash       Current Outpatient Medications   Medication Sig    metFORMIN (GLUCOPHAGE) 500 mg tablet Take 500 mg by mouth two (2) times daily (with meals). apixaban (ELIQUIS) 5 mg tablet Take 5 mg by mouth two (2) times a day. tacrolimus (PROGRAF) 1 mg capsule Take 1 Capsule by mouth every twelve (12) hours. gabapentin (NEURONTIN) 300 mg capsule TAKE 3 CAPSULES 3 TIMES    DAILY; MAXIMUM DAILY       AMOUNT: 2,700MG    multivitamin (ONE A DAY) tablet Take 1 Tab by mouth daily. bumetanide (BUMEX) 1 mg tablet Take  by mouth daily. potassium chloride SR (KLOR-CON 10) 10 mEq tablet Take 90 mEq by mouth daily. spironolactone (ALDACTONE) 25 mg tablet Take  by mouth daily. ALIPOIC ACID/BIOFLAV/MV/GR TEA (ULTRA SAMUEL PO) Take  by mouth. Omeprazole delayed release (PRILOSEC D/R) 20 mg tablet Take 20 mg by mouth two (2) times a day. No current facility-administered medications for this visit. SYSTEM REVIEW NOT RELATED TO LIVER DISEASE OR REVIEWED ABOVE:  Constitution systems: Negative for fever, chills, weight gain, weight loss. Eyes: Negative for visual changes. ENT: Negative for sore throat, painful swallowing. Respiratory: Negative for cough, hemoptysis, SOB. Cardiology: Negative for chest pain, palpitations. H/O heart valve surgery. GI:  Negative for constipation or diarrhea. : Negative for urinary frequency, dysuria, hematuria, nocturia. Skin: Negative for rash. H/O metastatic skin cancer  Hematology: Negative for easy bruising, blood clots.     Musculo-skelatal: Negative for back pain, muscle pain, weakness. Neurologic: Negative for headaches, dizziness, vertigo, memory problems not related to HE. Psychology: Negative for anxiety, depression. FAMILY HISTORY:  There is no family history of liver disease  There is no family history of heart disease    SOCIAL HISTORY:  The patient is . The patient has 3 children, 3 stepchildren, and 10 grandchildren, 1 great grandchild. The patient has never used tobacco products. The patient has never consumed significant amounts of alcohol. The patient used to work as in many different jobs. PHYSICAL EXAMINATION:  Visit Vitals  BP (!) 148/71 (BP 1 Location: Left upper arm, BP Patient Position: Sitting, BP Cuff Size: Adult)   Pulse 73   Temp 98.6 °F (37 °C) (Tympanic)   Ht 5' 3\" (1.6 m)   Wt 148 lb (67.1 kg)   SpO2 93%   BMI 26.22 kg/m²       General: No acute distress. Eyes: Sclera anicteric. ENT: No oral lesions. Thyroid normal.  Nodes: No adenopathy. Skin: No spider angiomata. No jaundice. No palmar erythema. Respiratory: Lungs clear to auscultation. Crackles in right base. Diminished in left base. Cardiovascular: Regular heart rate. No murmurs. No JVD. Abdomen: Soft non-tender, liver size normal to percussion/palpation. Spleen not palpable. No obvious ascites. Extremities: No edema. No muscle wasting. No gross arthritic changes. Neurologic: Alert and oriented. Cranial nerves grossly intact. No asterixis.     LABORATORY STUDIES:  From: 9/02/2022  AST/ALT/ALP/T Bili/ALB:12/09/126/0.5/4.2  WBC/HB/PLT:8.8/8.3/243  NA/BUN/CREAT:135/13/0.99    Liver [place] of [address] [place] [address] Units 6/13/2022 6/2/2022 4/18/2022   WBC 3.4 - 10.8 x10E3/uL 6.9 6.2 11.8 (H)   ANC 1.4 - 7.0 x10E3/uL 5.1 4.7 9.8 (H)   HGB 11.1 - 15.9 g/dL 7.8 (L) 7.9 (L) 9.2 (L)    - 450 x10E3/uL 173 124 (L) 182   INR 0.9 - 1.2  1.0    AST 0 - 40 IU/L 15 18 17   ALT 0 - 32 IU/L 14 14 26   Alk Phos 44 - 121 IU/L 74 69 84 Bili, Total 0.0 - 1.2 mg/dL 0.5 0.6 0.5   Bili, Direct 0.00 - 0.40 mg/dL  0.21    Albumin 3.7 - 4.7 g/dL 3.9 3.8 4.4   BUN 8 - 27 mg/dL 13 10 25   Creat 0.57 - 1.00 mg/dL 0.90 0.84 0.92   Na 134 - 144 mmol/L 143 142 134   K 3.5 - 5.2 mmol/L 3.9 3.1 (L) 4.2   Cl 96 - 106 mmol/L 99 93 (L) 89 (L)   CO2 20 - 29 mmol/L 29 29 26   Glucose 65 - 99 mg/dL 102 (H) 93 86   Magnesium 1.6 - 2.3 mg/dL 1.6 1.5 (L) 1.7     Liver Virology and Transplant Immune Suppression Tacrolimus Level   Latest Ref Rng & Units 2.0 - 20.0 ng/mL   6/13/2022 2.6   6/2/2022 0.9 (L)     Laboratory testing from 6/13/2022 reviewed in detail. Additional testing included to evaluate progression or regression of disease. Laboratory testing results from today will be communicated by My Chart. SEROLOGIES:  Serologies Latest Ref Rng & Units 8/17/2016   Hep B Surface Ag Negative Negative   Hep B Core Ab, Total Negative Negative   Hep B Surface AB QL  Non Reactive     Serologies Latest Ref Rng & Units 3/29/2021   Ferritin 15 - 150 ng/mL 69   Iron % Saturation 15 - 55 % 18     LIVER HISTOLOGY:  Not available or performed    ENDOSCOPIC PROCEDURES:  Not available or performed    RADIOLOGY:  Not available or performed    OTHER TESTING:  Not available or performed    FOLLOW-UP:  All of the issues listed above in the Assessment and Plan were discussed with the patient. All questions were answered. The patient expressed a clear understanding of the above. [address] [facility] 87 in 3 months for ongoing monitoring. Will follow-up with results when available. ZUNILDA [facility] of [address] [id number] [address] Alarcon, [address] [facility], [place] [facility].  [address] [facility]

## 2022-09-12 NOTE — TELEPHONE ENCOUNTER
Patient's daughter called to notify you that the patient has been admitted to [facility]. Patient is requesting the results for the C Dif test to be faxed over to this hospital. Patient requesting a call back.

## 2022-10-07 ENCOUNTER — TELEPHONE (OUTPATIENT)
Dept: HEMATOLOGY | Age: 82
End: 2022-10-07

## 2022-10-07 NOTE — TELEPHONE ENCOUNTER
My Note      [address] [facility] Terrence Rosario)  called and states the medication rx ( tacrolimus) is being manufactured by a different company. Is it ok to switch?

## (undated) DEVICE — PACK PROCEDURE SURG ENT CUST

## (undated) DEVICE — ADHESIVE LIQ 2OZ ADJUNCT FOR DSG MASTISOL

## (undated) DEVICE — ADHESIVE ISLAND DRESSING: Brand: TELFA

## (undated) DEVICE — SOL IRR SOD CL 0.9% 1000ML BTL --

## (undated) DEVICE — SOLUTION SCRB 2OZ 10% POVIDONE IOD ANTIMIC BTL

## (undated) DEVICE — SPONGE GZ W4XL4IN COT RADPQ HIGHLY ABSRB

## (undated) DEVICE — BANDAGE COBAN 4 IN COMPR W4INXL5YD FOAM COHESIVE QUIK STK SELF ADH SFT

## (undated) DEVICE — TUBING SUCT 10FR MAL ALUM SHFT FN CAP VENT UNIV CONN W/ OBT

## (undated) DEVICE — STERILE POLYISOPRENE POWDER-FREE SURGICAL GLOVES: Brand: PROTEXIS

## (undated) DEVICE — TAPE ADH W0.5INXL10YD WHT PAPR GENTLE BRTH FLX COMFORTABLE

## (undated) DEVICE — STRIP,CLOSURE,WOUND,MEDI-STRIP,1/2X4: Brand: MEDLINE

## (undated) DEVICE — BIPOLAR FORCEPS CORD: Brand: VALLEYLAB

## (undated) DEVICE — SUT PROL 3-0 18IN PS2 BLU --

## (undated) DEVICE — PAD,NON-ADHERENT,3X8,STERILE,LF,1/PK: Brand: MEDLINE

## (undated) DEVICE — DRESSING,GAUZE,XEROFORM,CURAD,5"X9",ST: Brand: CURAD

## (undated) DEVICE — SYRINGE,EAR/ULCER, 2 OZ, STERILE: Brand: MEDLINE

## (undated) DEVICE — BANDAGE,GAUZE,BULKEE II,4.5"X4.1YD,STRL: Brand: MEDLINE

## (undated) DEVICE — BLADE, TONGUE, 6", STERILE: Brand: MEDLINE